# Patient Record
Sex: MALE | Race: BLACK OR AFRICAN AMERICAN | NOT HISPANIC OR LATINO | Employment: OTHER | URBAN - METROPOLITAN AREA
[De-identification: names, ages, dates, MRNs, and addresses within clinical notes are randomized per-mention and may not be internally consistent; named-entity substitution may affect disease eponyms.]

---

## 2020-02-07 ENCOUNTER — HOSPITAL ENCOUNTER (EMERGENCY)
Facility: HOSPITAL | Age: 65
Discharge: HOME | End: 2020-02-07
Attending: EMERGENCY MEDICINE
Payer: COMMERCIAL

## 2020-02-07 VITALS
SYSTOLIC BLOOD PRESSURE: 172 MMHG | RESPIRATION RATE: 28 BRPM | OXYGEN SATURATION: 97 % | HEART RATE: 52 BPM | BODY MASS INDEX: 30.8 KG/M2 | TEMPERATURE: 98 F | WEIGHT: 220 LBS | DIASTOLIC BLOOD PRESSURE: 70 MMHG | HEIGHT: 71 IN

## 2020-02-07 DIAGNOSIS — H81.11 BENIGN POSITIONAL VERTIGO, RIGHT: ICD-10-CM

## 2020-02-07 DIAGNOSIS — R42 DIZZY SPELLS: ICD-10-CM

## 2020-02-07 DIAGNOSIS — J30.2 SEASONAL ALLERGIES: Primary | ICD-10-CM

## 2020-02-07 PROCEDURE — 93005 ELECTROCARDIOGRAM TRACING: CPT | Mod: ER

## 2020-02-07 PROCEDURE — 99284 EMERGENCY DEPT VISIT MOD MDM: CPT | Mod: 25,ER

## 2020-02-07 PROCEDURE — 93010 EKG 12-LEAD: ICD-10-PCS | Mod: ,,, | Performed by: INTERNAL MEDICINE

## 2020-02-07 PROCEDURE — 93010 ELECTROCARDIOGRAM REPORT: CPT | Mod: ,,, | Performed by: INTERNAL MEDICINE

## 2020-02-07 RX ORDER — ALLOPURINOL 100 MG/1
100 TABLET ORAL DAILY
COMMUNITY
End: 2023-01-31

## 2020-02-07 RX ORDER — METOPROLOL SUCCINATE 25 MG/1
25 TABLET, EXTENDED RELEASE ORAL DAILY
COMMUNITY
End: 2023-12-04

## 2020-02-07 RX ORDER — TADALAFIL 10 MG/1
10 TABLET ORAL DAILY
COMMUNITY
End: 2023-07-12

## 2020-02-07 RX ORDER — MECLIZINE HCL 12.5 MG 12.5 MG/1
12.5 TABLET ORAL 3 TIMES DAILY PRN
Qty: 20 TABLET | Refills: 0 | Status: SHIPPED | OUTPATIENT
Start: 2020-02-07 | End: 2023-03-07

## 2020-02-07 RX ORDER — HYDROCHLOROTHIAZIDE 12.5 MG/1
12.5 TABLET ORAL DAILY
COMMUNITY
End: 2023-01-31

## 2020-02-07 RX ORDER — PREDNISONE 10 MG/1
10 TABLET ORAL DAILY
Qty: 5 TABLET | Refills: 0 | Status: SHIPPED | OUTPATIENT
Start: 2020-02-07 | End: 2020-02-12

## 2020-02-08 NOTE — ED PROVIDER NOTES
"Encounter Date: 2/7/2020    SCRIBE #1 NOTE: I, Vilma Jimenez, am scribing for, and in the presence of,  Dr. Tyler Parra. I have scribed the following portions of the note - Other sections scribed: HPI, ROS, PE.       History     Chief Complaint   Patient presents with    Dizziness     PT C/O DIZZY FEELING OFF AND ON SINCE LAST NIGHT WITH ANY SUDDEN MOVEMENT, PT REPORTS WENT TO URGENT CARE AND WAS SENT HERE FOR EVAL, ANTIVERT 25MG GIVEN AT URGENT CARE AT 1843     Maile Harris is a 65 y.o. male who presents to the ED s/p Urgent Care referral (PTA) complaining of acute intermittent dizziness beginning last night. Denies fever, cough, rhinorrhea, ST and nasal congestion. Notes dizziness worsens with "any sudden movements". Mentions Antivert 25mg at 6:43pm via Urgent Care.     The history is provided by the patient and the spouse. No  was used.     Review of patient's allergies indicates:   Allergen Reactions    Lisinopril Other (See Comments)     COUGH     Past Medical History:   Diagnosis Date    Glaucoma     Hypertension      History reviewed. No pertinent surgical history.  No family history on file.  Social History     Tobacco Use    Smoking status: Former Smoker    Smokeless tobacco: Never Used   Substance Use Topics    Alcohol use: Yes     Alcohol/week: 3.0 standard drinks     Types: 3 Cans of beer per week     Comment: 3 BEERS DAILY    Drug use: Never     Review of Systems   Constitutional: Negative.  Negative for fever.   HENT: Negative.  Negative for congestion, rhinorrhea and sore throat.    Eyes: Negative.  Negative for pain.   Respiratory: Negative.  Negative for cough and shortness of breath.    Cardiovascular: Negative.  Negative for chest pain.   Gastrointestinal: Negative.  Negative for abdominal pain and vomiting.   Genitourinary: Negative.  Negative for dysuria.   Musculoskeletal: Negative.  Negative for back pain.   Skin: Negative.  Negative for rash. "   Neurological: Positive for dizziness (Rapid onset. Rapid abatement. Completely postional.). Negative for headaches.   Hematological: Negative.    Psychiatric/Behavioral: Negative.    All other systems reviewed and are negative.      Physical Exam     Initial Vitals [02/07/20 2003]   BP Pulse Resp Temp SpO2   (!) 165/74 (!) 54 20 98.3 °F (36.8 °C) 97 %      MAP       --         Physical Exam    Nursing note and vitals reviewed.  Constitutional: He appears well-developed and well-nourished.   HENT:   Head: Normocephalic and atraumatic.   Right Ear: External ear normal.   Left Ear: External ear normal.   Nose: Mucosal edema present.   Mouth/Throat: Uvula is midline and mucous membranes are normal. No oropharyngeal exudate, posterior oropharyngeal edema or posterior oropharyngeal erythema.   + PND  + Ear fluid level on the RIGHT    Eyes: Conjunctivae and EOM are normal.   Neck: Normal range of motion. Neck supple. Carotid bruit is not present.   Cardiovascular: Normal rate, regular rhythm, normal heart sounds and intact distal pulses. Exam reveals no gallop and no friction rub.    No murmur heard.  Pulmonary/Chest: Effort normal and breath sounds normal. No respiratory distress. He has no wheezes. He has no rhonchi. He has no rales.   Abdominal: Soft. Bowel sounds are normal. He exhibits no distension. There is no tenderness. There is no rebound and no guarding.   Musculoskeletal: Normal range of motion.   Neurological: He is alert and oriented to person, place, and time. He has normal strength. No cranial nerve deficit or sensory deficit. He displays a negative Romberg sign. Coordination and gait normal. GCS eye subscore is 4. GCS verbal subscore is 5. GCS motor subscore is 6.   Skin: Skin is warm and dry.   Psychiatric: He has a normal mood and affect. His behavior is normal.         ED Course   Procedures  Labs Reviewed - No data to display  EKG Readings: (Independently Interpreted)   Initial Reading: No STEMI.  Rhythm: Normal Sinus Rhythm. Heart Rate: 51bpm. Ectopy: No Ectopy. Conduction: Normal. ST Segments: Normal ST Segments. T Waves: Normal. Clinical Impression: Normal Sinus Rhythm       Imaging Results    None          Medical Decision Making:   History:   Old Medical Records: I decided to obtain old medical records.  Independently Interpreted Test(s):   I have ordered and independently interpreted EKG Reading(s) - see prior notes  Clinical Tests:   Medical Tests: Ordered and Reviewed            Scribe Attestation:   Scribe #1: I performed the above scribed service and the documentation accurately describes the services I performed. I attest to the accuracy of the note.               This document was produced by a scribe under my direction and in my presence. I agree with the content of the note and have made any necessary edits.     Tyler Parra MD    02/08/2020 1:22 AM           Clinical Impression:     1. Seasonal allergies    2. Dizzy spells    3. Benign positional vertigo, right                                Tyler Parra MD  02/08/20 0122

## 2022-06-27 ENCOUNTER — TELEPHONE (OUTPATIENT)
Dept: FAMILY MEDICINE | Facility: CLINIC | Age: 67
End: 2022-06-27
Payer: COMMERCIAL

## 2022-09-06 ENCOUNTER — OFFICE VISIT (OUTPATIENT)
Dept: FAMILY MEDICINE | Facility: CLINIC | Age: 67
End: 2022-09-06
Payer: MEDICARE

## 2022-09-06 VITALS
TEMPERATURE: 98 F | HEIGHT: 71 IN | HEART RATE: 45 BPM | OXYGEN SATURATION: 97 % | SYSTOLIC BLOOD PRESSURE: 134 MMHG | DIASTOLIC BLOOD PRESSURE: 62 MMHG | WEIGHT: 216.38 LBS | BODY MASS INDEX: 30.29 KG/M2

## 2022-09-06 DIAGNOSIS — G89.29 CHRONIC BILATERAL LOW BACK PAIN WITHOUT SCIATICA: ICD-10-CM

## 2022-09-06 DIAGNOSIS — Z00.00 ROUTINE ADULT HEALTH MAINTENANCE: Primary | ICD-10-CM

## 2022-09-06 DIAGNOSIS — Z11.59 NEED FOR HEPATITIS C SCREENING TEST: ICD-10-CM

## 2022-09-06 DIAGNOSIS — I10 ESSENTIAL HYPERTENSION: ICD-10-CM

## 2022-09-06 DIAGNOSIS — Z12.5 SCREENING FOR MALIGNANT NEOPLASM OF PROSTATE: ICD-10-CM

## 2022-09-06 DIAGNOSIS — M10.00 IDIOPATHIC GOUT, UNSPECIFIED CHRONICITY, UNSPECIFIED SITE: ICD-10-CM

## 2022-09-06 DIAGNOSIS — E78.5 DYSLIPIDEMIA: ICD-10-CM

## 2022-09-06 DIAGNOSIS — R05.3 CHRONIC COUGH: ICD-10-CM

## 2022-09-06 DIAGNOSIS — M54.50 CHRONIC BILATERAL LOW BACK PAIN WITHOUT SCIATICA: ICD-10-CM

## 2022-09-06 DIAGNOSIS — E66.09 CLASS 1 OBESITY DUE TO EXCESS CALORIES WITH SERIOUS COMORBIDITY AND BODY MASS INDEX (BMI) OF 30.0 TO 30.9 IN ADULT: ICD-10-CM

## 2022-09-06 DIAGNOSIS — Z23 NEED FOR SHINGLES VACCINE: ICD-10-CM

## 2022-09-06 DIAGNOSIS — Z13.6 SCREENING FOR AAA (ABDOMINAL AORTIC ANEURYSM): ICD-10-CM

## 2022-09-06 DIAGNOSIS — R73.03 PREDIABETES: ICD-10-CM

## 2022-09-06 PROCEDURE — 99999 PR PBB SHADOW E&M-EST. PATIENT-LVL IV: CPT | Mod: PBBFAC,,, | Performed by: INTERNAL MEDICINE

## 2022-09-06 PROCEDURE — 99204 OFFICE O/P NEW MOD 45 MIN: CPT | Mod: S$PBB,,, | Performed by: INTERNAL MEDICINE

## 2022-09-06 PROCEDURE — 99214 OFFICE O/P EST MOD 30 MIN: CPT | Mod: PBBFAC,PO | Performed by: INTERNAL MEDICINE

## 2022-09-06 PROCEDURE — 99204 PR OFFICE/OUTPT VISIT, NEW, LEVL IV, 45-59 MIN: ICD-10-PCS | Mod: S$PBB,,, | Performed by: INTERNAL MEDICINE

## 2022-09-06 PROCEDURE — 99999 PR PBB SHADOW E&M-EST. PATIENT-LVL IV: ICD-10-PCS | Mod: PBBFAC,,, | Performed by: INTERNAL MEDICINE

## 2022-09-06 RX ORDER — TADALAFIL 20 MG/1
TABLET ORAL
COMMUNITY
Start: 2022-04-25

## 2022-09-06 RX ORDER — PANTOPRAZOLE SODIUM 20 MG/1
20 TABLET, DELAYED RELEASE ORAL NIGHTLY
Qty: 30 TABLET | Refills: 0 | Status: SHIPPED | OUTPATIENT
Start: 2022-09-06 | End: 2022-09-16 | Stop reason: SDUPTHER

## 2022-09-06 RX ORDER — ROSUVASTATIN CALCIUM 10 MG/1
10 TABLET, COATED ORAL DAILY
COMMUNITY
Start: 2022-06-21 | End: 2022-09-15 | Stop reason: SDUPTHER

## 2022-09-06 RX ORDER — DORZOLAMIDE HCL 20 MG/ML
SOLUTION/ DROPS OPHTHALMIC
COMMUNITY
Start: 2022-08-18

## 2022-09-06 RX ORDER — BRIMONIDINE TARTRATE, TIMOLOL MALEATE 2; 5 MG/ML; MG/ML
1 SOLUTION/ DROPS OPHTHALMIC 2 TIMES DAILY
COMMUNITY
Start: 2022-07-26

## 2022-09-06 RX ORDER — INDAPAMIDE 1.25 MG/1
1.25 TABLET ORAL
COMMUNITY
End: 2023-01-31 | Stop reason: SDUPTHER

## 2022-09-06 RX ORDER — LATANOPROST 50 UG/ML
1 SOLUTION/ DROPS OPHTHALMIC NIGHTLY
COMMUNITY
Start: 2022-09-01
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
## 2022-09-06 NOTE — PATIENT INSTRUCTIONS
The [facility] ([phone]) is a comprehensive treatment approach for individuals with chronic or recurrent neck or back pain. Treatment focuses on active recovery, restoring spinal motion and strength, and ultimately getting people back to the activities they enjoy. While receiving care, patients work with an integrated healthcare team including a Physician or Advanced Practice Provider, Physical Therapists, and Health and Wellness Coaches. Patients also engage in an active physical therapy program focused on pain reduction and return to function, then transition to a Wellness Program for general strength maintenance.    What you should expect    A thorough evaluation with one of our skilled Physical Therapists to identify musculoskeletal impairments and movement dysfunctions  Strength testing of the cervical and/or lumbar extensor muscles using medical machines that isolate the targeted muscle groups. These results will provide a baseline measure of your back or neck strength, help determine an appropriate starting exercise weight, and allow for objective measurement of your progress as you go through treatment.   An active, evidenced-based, research-driven Physical Therapy program focused on neck and/or back pain reduction, targeted strengthening of the spinal and surrounding musculature, and education to promote understanding, prevention, and management of your pain.  Regular meetings with a Health  who will assist you with achieving your health and fitness goals  Enrollment into a wellness program after successful completion of Physical Therapy. Here you will work with a team of Wellness Coaches to help you maintain the strength and functional movement gains you've achieved with Physical Therapy.    Integrated Therapy Team - Your therapy team consists of your Physician, your physical therapists, health coaches, and wellness coaches.  Your team will work together to provide you  with comprehensive care and ensure you are successful in the program. Physical Therapy Evaluation and Measurements- You will be seen by a Physical Therapist for an evaluation.  This evaluation will include a mechanical evaluation to determine the best strategies for managing your symptoms. Your Physical Therapist will also conduct a strength assessment on the medical exercise machines, to determine where your spine strength and motion issues are.   Health Coaching-The health  can assist you with areas you seek guidance in such as nutrition and diet, goal setting, stress management, and deep breathing/relation techniques.  You can meet with the health  face to face or by phone.  Please speak with your therapist if you would like to utilize this valuable service. Clinical Stretches and Exercise - Most neck and back pain gets better or worse depending on the movement or position you are in.  Your physical therapy team will evaluate you and determine the stretches you should perform to control your neck and back pain on your own.   Specialized Therapy Equipment - The equipment protocols used in the program are supported by over 25 years of medical research.  As your spinal strength increases, you will feel better and be able to perform more activities. General Strengthening - It is not just your neck or back that walk around.  Strengthening the muscles in your upper and lower body will   allow you to be more active and enhance your overall health and well-being.   Weekly Visits - Patients are encouraged to attend 2 times a week for an average of 10 weeks, under the direction of a Physical Therapist.  Consistently attending weekly visits allows you to get progressively stronger and get the most out of your program. Wellness Program - After completing your visits in the program with a Physical Therapist, you can participate in our supervised wellness program.  Our wellness program is staffed with [employer]  coaches and is available for you when you complete your therapy.       Scientifically-[employer]    Through progressive resistance strengthening and the principles of adaptive overload, patients suffering from chronic back and neck pain experience improved muscle strength, disc health, increased connective tissue thickness, endurance, mobility, and function over the duration of their treatment. As musculoskeletal function improves, symptoms diminish and their psychological outlook improves.    Over the last 25 years, the [facility], the [facility], and medical institutions around the world have published compelling research showing that specific spinal strengthening using the FDA-registered MedX Lumbar and Cervical Extension machines improves patient outcomes even after multiple failed attempts at other forms of treatment. With more than 75 published articles in peer-reviewed journals, the proof lies in both the research and patient testimonials.    How to Enroll    Give us a call at [phone], our Central Scheduling number, to schedule an initial appointment with our Physicians/APPs or Physical Therapists who will complete a thorough examination to determine if you may benefit from this treatment approach.

## 2022-09-08 ENCOUNTER — LAB VISIT (OUTPATIENT)
Dept: LAB | Facility: HOSPITAL | Age: 67
End: 2022-09-08
Attending: EMERGENCY MEDICINE
Payer: MEDICARE

## 2022-09-08 DIAGNOSIS — I10 ESSENTIAL HYPERTENSION: ICD-10-CM

## 2022-09-08 DIAGNOSIS — E78.5 DYSLIPIDEMIA: ICD-10-CM

## 2022-09-08 DIAGNOSIS — Z12.5 SCREENING FOR MALIGNANT NEOPLASM OF PROSTATE: ICD-10-CM

## 2022-09-08 DIAGNOSIS — Z11.59 NEED FOR HEPATITIS C SCREENING TEST: ICD-10-CM

## 2022-09-08 DIAGNOSIS — M10.00 IDIOPATHIC GOUT, UNSPECIFIED CHRONICITY, UNSPECIFIED SITE: ICD-10-CM

## 2022-09-08 DIAGNOSIS — R73.03 PREDIABETES: ICD-10-CM

## 2022-09-08 LAB
ANION GAP SERPL CALC-SCNC: 8 MMOL/L (ref 8–16)
BASOPHILS # BLD AUTO: 0.05 K/UL (ref 0–0.2)
BASOPHILS NFR BLD: 0.9 % (ref 0–1.9)
BUN SERPL-MCNC: 13 MG/DL (ref 8–23)
CALCIUM SERPL-MCNC: 9.1 MG/DL (ref 8.7–10.5)
CHLORIDE SERPL-SCNC: 101 MMOL/L (ref 95–110)
CHOLEST SERPL-MCNC: 158 MG/DL (ref 120–199)
CHOLEST/HDLC SERPL: 2.5 {RATIO} (ref 2–5)
CO2 SERPL-SCNC: 24 MMOL/L (ref 23–29)
COMPLEXED PSA SERPL-MCNC: 2.2 NG/ML (ref 0–4)
CREAT SERPL-MCNC: 1.2 MG/DL (ref 0.5–1.4)
DIFFERENTIAL METHOD: NORMAL
EOSINOPHIL # BLD AUTO: 0.2 K/UL (ref 0–0.5)
EOSINOPHIL NFR BLD: 3.9 % (ref 0–8)
ERYTHROCYTE [DISTWIDTH] IN BLOOD BY AUTOMATED COUNT: 12.9 % (ref 11.5–14.5)
EST. GFR  (NO RACE VARIABLE): >60 ML/MIN/1.73 M^2
ESTIMATED AVG GLUCOSE: 131 MG/DL (ref 68–131)
GLUCOSE SERPL-MCNC: 108 MG/DL (ref 70–110)
HBA1C MFR BLD: 6.2 % (ref 4–5.6)
HCT VFR BLD AUTO: 41.6 % (ref 40–54)
HCV AB SERPL QL IA: NORMAL
HDLC SERPL-MCNC: 64 MG/DL (ref 40–75)
HDLC SERPL: 40.5 % (ref 20–50)
HGB BLD-MCNC: 14.4 G/DL (ref 14–18)
IMM GRANULOCYTES # BLD AUTO: 0.01 K/UL (ref 0–0.04)
IMM GRANULOCYTES NFR BLD AUTO: 0.2 % (ref 0–0.5)
LDLC SERPL CALC-MCNC: 77.6 MG/DL (ref 63–159)
LYMPHOCYTES # BLD AUTO: 1.9 K/UL (ref 1–4.8)
LYMPHOCYTES NFR BLD: 35 % (ref 18–48)
MCH RBC QN AUTO: 30.8 PG (ref 27–31)
MCHC RBC AUTO-ENTMCNC: 34.6 G/DL (ref 32–36)
MCV RBC AUTO: 89 FL (ref 82–98)
MONOCYTES # BLD AUTO: 0.5 K/UL (ref 0.3–1)
MONOCYTES NFR BLD: 8.8 % (ref 4–15)
NEUTROPHILS # BLD AUTO: 2.8 K/UL (ref 1.8–7.7)
NEUTROPHILS NFR BLD: 51.2 % (ref 38–73)
NONHDLC SERPL-MCNC: 94 MG/DL
NRBC BLD-RTO: 0 /100 WBC
PLATELET # BLD AUTO: 251 K/UL (ref 150–450)
PMV BLD AUTO: 9.8 FL (ref 9.2–12.9)
POTASSIUM SERPL-SCNC: 3.7 MMOL/L (ref 3.5–5.1)
RBC # BLD AUTO: 4.67 M/UL (ref 4.6–6.2)
SODIUM SERPL-SCNC: 133 MMOL/L (ref 136–145)
TRIGL SERPL-MCNC: 82 MG/DL (ref 30–150)
URATE SERPL-MCNC: 6.9 MG/DL (ref 3.4–7)
WBC # BLD AUTO: 5.37 K/UL (ref 3.9–12.7)

## 2022-09-08 PROCEDURE — 84153 ASSAY OF PSA TOTAL: CPT | Performed by: INTERNAL MEDICINE

## 2022-09-08 PROCEDURE — 80061 LIPID PANEL: CPT | Performed by: INTERNAL MEDICINE

## 2022-09-08 PROCEDURE — 80048 BASIC METABOLIC PNL TOTAL CA: CPT | Performed by: INTERNAL MEDICINE

## 2022-09-08 PROCEDURE — 85025 COMPLETE CBC W/AUTO DIFF WBC: CPT | Performed by: INTERNAL MEDICINE

## 2022-09-08 PROCEDURE — 86803 HEPATITIS C AB TEST: CPT | Performed by: INTERNAL MEDICINE

## 2022-09-08 PROCEDURE — 36415 COLL VENOUS BLD VENIPUNCTURE: CPT | Mod: PO | Performed by: INTERNAL MEDICINE

## 2022-09-08 PROCEDURE — 83036 HEMOGLOBIN GLYCOSYLATED A1C: CPT | Performed by: INTERNAL MEDICINE

## 2022-09-08 PROCEDURE — 84550 ASSAY OF BLOOD/URIC ACID: CPT | Performed by: INTERNAL MEDICINE

## 2022-09-12 ENCOUNTER — TELEPHONE (OUTPATIENT)
Dept: FAMILY MEDICINE | Facility: CLINIC | Age: 67
End: 2022-09-12
Payer: MEDICARE

## 2022-09-12 ENCOUNTER — PATIENT MESSAGE (OUTPATIENT)
Dept: ADMINISTRATIVE | Facility: HOSPITAL | Age: 67
End: 2022-09-12
Payer: MEDICARE

## 2022-09-12 DIAGNOSIS — Z12.5 SCREENING FOR PROSTATE CANCER: ICD-10-CM

## 2022-09-12 DIAGNOSIS — E78.5 DYSLIPIDEMIA: Primary | ICD-10-CM

## 2022-09-12 DIAGNOSIS — M10.00 IDIOPATHIC GOUT, UNSPECIFIED CHRONICITY, UNSPECIFIED SITE: ICD-10-CM

## 2022-09-12 DIAGNOSIS — R73.03 PREDIABETES: ICD-10-CM

## 2022-09-12 DIAGNOSIS — I10 ESSENTIAL HYPERTENSION: ICD-10-CM

## 2022-09-12 NOTE — TELEPHONE ENCOUNTER
Below information given to patient, verbalized   Understanding. Patient will like lab orders placed to scheduled appointment before next visit in march please advise.

## 2022-09-12 NOTE — TELEPHONE ENCOUNTER
----- Message from Lamberto Wolfe MD sent at 9/9/2022  8:14 PM CDT -----  Please call the patient regarding the following results:    Sodium level is mildly decreased - this is likely due to medication side effect and is not worrisome. I will repeat in March 2023.    Cholesterol is at our goal. All other labs are otherwise within normal/expected limits.    Let me know if you have any other questions or concerns.      Schedule lab follow-up: Yes - prior to 3/7/23 visit   Schedule appointment: not applicable

## 2022-09-15 ENCOUNTER — HOSPITAL ENCOUNTER (OUTPATIENT)
Dept: RADIOLOGY | Facility: HOSPITAL | Age: 67
Discharge: HOME | End: 2022-09-15
Attending: INTERNAL MEDICINE
Payer: MEDICARE

## 2022-09-15 DIAGNOSIS — Z13.6 SCREENING FOR AAA (ABDOMINAL AORTIC ANEURYSM): ICD-10-CM

## 2022-09-15 DIAGNOSIS — R05.3 CHRONIC COUGH: ICD-10-CM

## 2022-09-15 PROCEDURE — 76775 US ABDOMINAL AORTA: ICD-10-PCS | Mod: 26,,, | Performed by: INTERNAL MEDICINE

## 2022-09-15 PROCEDURE — 76775 US EXAM ABDO BACK WALL LIM: CPT | Mod: TC

## 2022-09-15 PROCEDURE — 76775 US EXAM ABDO BACK WALL LIM: CPT | Mod: 26,,, | Performed by: INTERNAL MEDICINE

## 2022-09-15 NOTE — TELEPHONE ENCOUNTER
----- Message from Ghazala Tavarez sent at 9/15/2022  3:42 PM CDT -----  Type: RX Refill Request    Who Called: self    Have you contacted your pharmacy:yes    Refill or New Rx:refill    RX Name and Strength:rosuvastatin (CRESTOR) 10 MG tablet      Preferred Pharmacy with phone number:[id number] [place] [address] [address] AT [facility]   Phone:  [phone]  Fax:  [phone]      Local or Mail Order:local    Ordering Provider:Aiden    Would the patient rather a call back or a response via My [facility]? call    Best Call Back Number:[phone] ([place])

## 2022-09-15 NOTE — TELEPHONE ENCOUNTER
No new care gaps identified.  [facility] Embedded Care Gaps. Reference number: [id number]. 9/15/2022   3:50:18 PM CDT

## 2022-09-16 RX ORDER — ROSUVASTATIN CALCIUM 10 MG/1
10 TABLET, COATED ORAL DAILY
Qty: 30 TABLET | Refills: 2 | Status: SHIPPED | OUTPATIENT
Start: 2022-09-16 | End: 2023-03-07 | Stop reason: SDUPTHER

## 2022-09-16 RX ORDER — PANTOPRAZOLE SODIUM 20 MG/1
20 TABLET, DELAYED RELEASE ORAL NIGHTLY
Qty: 30 TABLET | Refills: 2 | Status: SHIPPED | OUTPATIENT
Start: 2022-09-16 | End: 2023-03-07 | Stop reason: SDUPTHER

## 2022-10-11 ENCOUNTER — CLINICAL SUPPORT (OUTPATIENT)
Dept: REHABILITATION | Facility: HOSPITAL | Age: 67
End: 2022-10-11
Attending: INTERNAL MEDICINE
Payer: MEDICARE

## 2022-10-11 DIAGNOSIS — R29.3 POOR POSTURE: ICD-10-CM

## 2022-10-11 DIAGNOSIS — M25.69 DECREASED RANGE OF MOTION OF TRUNK AND BACK: ICD-10-CM

## 2022-10-11 DIAGNOSIS — G89.29 CHRONIC BILATERAL LOW BACK PAIN WITHOUT SCIATICA: ICD-10-CM

## 2022-10-11 DIAGNOSIS — R29.898 DECREASED STRENGTH OF TRUNK AND BACK: ICD-10-CM

## 2022-10-11 DIAGNOSIS — M54.50 CHRONIC BILATERAL LOW BACK PAIN WITHOUT SCIATICA: ICD-10-CM

## 2022-10-11 PROCEDURE — 97161 PT EVAL LOW COMPLEX 20 MIN: CPT | Mod: PN

## 2022-10-11 PROCEDURE — 97110 THERAPEUTIC EXERCISES: CPT | Mod: PN
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
## 2022-10-27 LAB — CRC RECOMMENDATION EXT: NORMAL

## 2022-11-04 ENCOUNTER — CLINICAL SUPPORT (OUTPATIENT)
Dept: REHABILITATION | Facility: HOSPITAL | Age: 67
End: 2022-11-04
Payer: MEDICARE

## 2022-11-04 DIAGNOSIS — R29.3 POOR POSTURE: Primary | ICD-10-CM

## 2022-11-04 DIAGNOSIS — R29.898 DECREASED STRENGTH OF TRUNK AND BACK: ICD-10-CM

## 2022-11-04 DIAGNOSIS — M25.69 DECREASED RANGE OF MOTION OF TRUNK AND BACK: ICD-10-CM

## 2022-11-04 PROCEDURE — 97110 THERAPEUTIC EXERCISES: CPT | Mod: PN,CQ

## 2022-11-04 NOTE — PROGRESS NOTES
"[facility] Physical Therapy Treatment      Name: Maile Harris  Clinic Number: [id number]    Therapy Diagnosis:   Encounter Diagnoses   Name Primary?    Poor posture Yes    Decreased range of motion of trunk and back     Decreased strength of trunk and back      Physician: Lamberto Wolfe MD    Visit Date: 11/4/2022    Physician orders: PT Eval and Treat   Medical diagnosis from referral: M54.50,G89.29 (ICD-10-CM) - Chronic bilateral low back pain without sciatica  Evaluation date: 10/11/2022  Authorization period expiration: 9/6/23  Plan of care expiration: 1/11/2023  Reassessment due: 11/11/2022   Visit # / visits authorized: 2/ 20    Time In: 140PM (pt late)  Time Out: 230PM  Total Billable Time: 50 minutes  Insurance type:  Fee for service Insurance Patient    Precautions: Standard HTN     Pattern of pain determined: Pattern 1 PEN    Subjective   Maile VEGA reports improvement of symptoms.  He is feeling pretty good today. He has been performing his HEP though not every day.     Patient reports tolerating previous visit some soreness  Patient reports their pain to be  0 /10 on a 0-10 scale with 0 being no pain and 10 being the worst pain imaginable.  Pain Location: Low back      Occupation: retired, former    Leisure: playing [employer]  Pts goals: "To move easier"     Objective     Baseline IM Testing Results:   Date of testing: 10/11/22  ROM 6-36 deg   Max Peak Torque 72    Min Peak Torque 23    Flex/Ext Ratio 3.1:1   % below normative data 73%            Limitation/restriction for FOTO Lumbar Survey     Therapist reviewed FOTO scores for Maile Harris on 10/11/2022.   FOTO documents entered into [employer] - see Media section.     Limitation Score: 43%             Treatment    Pt was instructed in and performed the following:     Maile VEGA received therapeutic exercises to develop/improve posture, lumbar/thoracic ROM, strength and muscular endurance for 50 minutes including the " "following exercises:      PPT 15 x 3"  DKTC x 10 x 10"  LTR x 20  SOC x 12    [facility] 11/4/2022   Visit Number 2   VAS Pain Rating 0   Treadmill Time (in min.) 10   Speed 1.8   Lumbar Stretches - Slouch Overcorrection 12   Extension in Lying -   Extension in Standing 10   Flexion in Lying -   Lumbar Extension Seat Pad -   Femur Restraint -   Top Dead Center -   Counterweight -   Lumbar Flexion -   Lumbar Extension -   Lumbar Peak Torque -   Min Torque -   Test Percent Below Normative Data -   Lumbar Weight 35   Repetitions 20   Rating of Perceived Exertion 2   Ice - Z Lie (in min.) -         Peripheral muscle strengthening which included 1 set of 15-20 repetitions at a slow, controlled 10-13 second per rep pace focused on strengthening supporting musculature for improved body mechanics and functional mobility.  Pt and therapist focused on proper form during treatment to ensure optimal strengthening of each targeted muscle group.  Machines were utilized including torso rotation, chest press, rowing, biceps, and triceps. Leg extension, leg curl, hip abd, hip add, and leg press added visit 3       Mindymandivitor VEGA received the following manual therapy techniques: null    Home Exercises Provided and Patient Education Provided   Stretching: LTR, PPT, SOC,DKTC  Strengthening:   Cardio program (V5):   Lifting education (V11):  Posture/ Using Lumbar Roll: Encouraged 11/4/22    Education provided:   - HEP compliance  -Program progression  -DOMS    Written Home Exercises Provided: Patient instructed to cont prior HEP.  Exercises were reviewed and Maile VEGA was able to demonstrate them prior to the end of the session.  Maile VEGA demonstrated good  understanding of the education provided.     See EMR under patient instructions for exercises provided 10/11/2022.   Assessment   Shanevitor tolerated first treatment session well. Reviewed HEP with pt requiring Mod verbal cues for form. Stressed the importance of HEP " compliance to maximize therapeutic outcomes. Pt was encouraged to use lumbar roll for posture correction. MedX was performed at 35ft lbs with 20 reps performed at an exertion rating of 2/10. No issues with Matrix UE strengthening.        Patient is making good progress towards established goals.  Pt will continue to benefit from skilled outpatient physical therapy to address the deficits stated in the impairment chart, provide pt/family education and to maximize pt's level of independence in the home and community environment.     Anticipated barriers for therapy: none   Pt's spiritual, cultural and educational needs considered and pt agreeable to plan of care and goals as stated below:     Goals:  Pt is in agreement with the following goals.     Short term goals: 6 weeks or 10 visits   1.  Pt will demonstrate increased lumbar ROM by at least 6 degrees from the initial ROM value with improvements noted in functional ROM and ability to perform ADLs.  (appropriate & ongoing)  2.  Pt will demonstrate increased MedX average isometric strength value by 35% from initial test, resulting in improved ability to perform bending, lifting, and carrying activities safely and confidently.  (appropriate & ongoing)  3.  Pt will report a reduction in worst pain score by 1-2 points for improved tolerance for QoL.  (appropriate & ongoing)  4.  Pt will be able to perform HEP correctly with minimal cueing or supervision from therapist to encourage independent management of symptoms. (appropriate & ongoing)     Long term goals: 10 weeks or 20 visits   1. Pt will demonstrate increased lumbar ROM by at least 12 degrees from initial ROM value, resulting in improved ability to perform functional fwd bending while standing and sitting. (appropriate & ongoing)  2. Pt will demonstrate increased MedX average isometric strength value by 70% from initial test, resulting in improved ability to perform bending, lifting, and carrying activities  safely and confidently.  (appropriate & ongoing)  3. Pt to demonstrate ability to independently control and/or reduce their pain through posture positioning and mechanical movements throughout a typical day. (appropriate & ongoing)  4.  Pt will demonstrate reduced pain and improved functional outcomes as reported on the FOTO by reaching a limitation score of < or = 30%% or less in order to demonstrate subjective improvement in pt's condition. .  (appropriate & ongoing)  5. Pt will demonstrate independence with HEP at discharge. (appropriate & ongoing)     Plan   Continue with established Plan of Care towards established PT goals.       Therapist: Aakash Jamison, PTA  11/4/2022

## 2022-11-09 PROBLEM — H40.9 GLAUCOMA: Status: ACTIVE | Noted: 2022-11-09

## 2022-11-09 PROBLEM — I12.9 CHRONIC KIDNEY DISEASE DUE TO HYPERTENSION: Status: ACTIVE | Noted: 2022-11-09

## 2022-11-09 PROBLEM — N40.1 LOWER URINARY TRACT SYMPTOMS DUE TO BENIGN PROSTATIC HYPERPLASIA: Status: ACTIVE | Noted: 2022-11-09

## 2022-11-09 PROBLEM — N13.8 BPH WITH OBSTRUCTION/LOWER URINARY TRACT SYMPTOMS: Status: ACTIVE | Noted: 2019-05-22

## 2022-11-09 PROBLEM — N40.1 BPH WITH OBSTRUCTION/LOWER URINARY TRACT SYMPTOMS: Status: ACTIVE | Noted: 2019-05-22

## 2022-11-09 PROBLEM — M10.00 IDIOPATHIC GOUT: Status: ACTIVE | Noted: 2022-11-09

## 2022-11-09 PROBLEM — E11.9 TYPE 2 DIABETES MELLITUS WITHOUT COMPLICATION, WITHOUT LONG-TERM CURRENT USE OF INSULIN: Status: ACTIVE | Noted: 2022-11-09

## 2022-11-09 PROBLEM — F51.04 PSYCHOPHYSIOLOGIC INSOMNIA: Status: ACTIVE | Noted: 2022-11-09

## 2022-11-09 PROBLEM — E78.5 HYPERLIPIDEMIA: Status: ACTIVE | Noted: 2022-11-09

## 2022-11-09 PROBLEM — M19.91 PRIMARY OSTEOARTHRITIS: Status: ACTIVE | Noted: 2022-11-09

## 2022-11-09 PROBLEM — N52.9 ED (ERECTILE DYSFUNCTION): Status: ACTIVE | Noted: 2019-05-22

## 2022-11-10 ENCOUNTER — TELEPHONE (OUTPATIENT)
Dept: FAMILY MEDICINE | Facility: CLINIC | Age: 67
End: 2022-11-10
Payer: MEDICARE

## 2022-11-10 ENCOUNTER — CLINICAL SUPPORT (OUTPATIENT)
Dept: REHABILITATION | Facility: HOSPITAL | Age: 67
End: 2022-11-10
Payer: MEDICARE

## 2022-11-10 DIAGNOSIS — R29.3 POOR POSTURE: Primary | ICD-10-CM

## 2022-11-10 DIAGNOSIS — M25.69 DECREASED RANGE OF MOTION OF TRUNK AND BACK: ICD-10-CM

## 2022-11-10 DIAGNOSIS — R29.898 DECREASED STRENGTH OF TRUNK AND BACK: ICD-10-CM

## 2022-11-10 PROCEDURE — 97110 THERAPEUTIC EXERCISES: CPT | Mod: PN

## 2022-11-10 NOTE — PROGRESS NOTES
"[facility]      Name: Maile Harris  Clinic Number: [id number]    Therapy Diagnosis:   Encounter Diagnoses   Name Primary?    Poor posture Yes    Decreased range of motion of trunk and back     Decreased strength of trunk and back      Physician: Lamberto Wolfe MD    Visit Date: 11/10/2022    Physician orders: PT Eval and Treat   Medical diagnosis from referral: M54.50,G89.29 (ICD-10-CM) - Chronic bilateral low back pain without sciatica  Evaluation date: 10/11/2022  Authorization period expiration: 9/6/23  Plan of care expiration: 1/11/2023  Reassessment due: 11/11/2022   Visit # / visits authorized: 3/ 20    Time In: 1500  Time Out: 1600  Total Billable Time: 55 minutes  Insurance type:  Fee for service Insurance Patient    Precautions: Standard HTN     Pattern of pain determined: Pattern 1 PEN    Subjective   Maile VEGA reports that he is feeling pretty good today. No pain noted and he has been compliant with exercises at home.     Patient reports tolerating previous visit some soreness  Patient reports their pain to be  0 /10 on a 0-10 scale with 0 being no pain and 10 being the worst pain imaginable.  Pain Location: Low back      Occupation: retired, former    Leisure: playing [email or web address]  Pts goals: "To move easier"     Objective     Baseline IM Testing Results:   Date of testing: 10/11/22  ROM 6-36 deg   Max Peak Torque 72    Min Peak Torque 23    Flex/Ext Ratio 3.1:1   % below normative data 73%            Limitation/restriction for FOTO Lumbar Survey     Therapist reviewed FOTO scores for Maile Harris on 10/11/2022.   FOTO documents entered into [employer] - see Media section.     Limitation Score: 43%             Treatment    Pt was instructed in and performed the following:     Maile VEGA received therapeutic exercises to develop/improve posture, lumbar/thoracic ROM, strength and muscular endurance for 50 minutes including the following exercises:    " "  PPT 15 x 3"  DKTC x 10 x 10"  LTR x 20  SOC x 12  +Bridges x15  +Hamstring stretch 3x20" ea    [facility] 11/10/2022   Visit Number 3   VAS Pain Rating 0   Treadmill Time (in min.) -   Speed -   Time 8   Lumbar Stretches - Slouch Overcorrection 12   Extension in Lying -   Extension in Standing 12   Flexion in Lying 10   Lumbar Extension Seat Pad -   Femur Restraint -   Top Dead Center -   Counterweight -   Lumbar Flexion -   Lumbar Extension -   Lumbar Peak Torque -   Min Torque -   Test Percent Below Normative Data -   Lumbar Weight 45   Repetitions 15   Rating of Perceived Exertion 3   Ice - Z Lie (in min.) -        Peripheral muscle strengthening which included 1 set of 15-20 repetitions at a slow, controlled 10-13 second per rep pace focused on strengthening supporting musculature for improved body mechanics and functional mobility.  Pt and therapist focused on proper form during treatment to ensure optimal strengthening of each targeted muscle group.  Machines were utilized including torso rotation, chest press, rowing, biceps, and triceps. Leg extension, leg curl, hip abd, hip add, and leg press added visit 3       Maile VEGA received the following manual therapy techniques: null    Home Exercises Provided and Patient Education Provided   Stretching: LTR, PPT, SOC,DKTC  Strengthening:   Cardio program (V5):   Lifting education (V11):  Posture/ Using Lumbar Roll: Encouraged 11/4/22    Education provided:   - HEP compliance  -Program progression  -DOMS    Written Home Exercises Provided: Patient instructed to cont prior HEP.  Exercises were reviewed and Maile VEGA was able to demonstrate them prior to the end of the session.  Maile VEGA demonstrated good  understanding of the education provided.     See EMR under patient instructions for exercises provided 10/11/2022.   Assessment   Maile VEGA tolerated treatment well today. Reports no pain upon arrival to clinic. Continued prior therex and progressed " with hamstring stretch and bridges to further challenge patient. Patient able to perform 15 reps at increased resistance on MedX machine with appropriate exertion. Added lower extremity peripheral machines today with no issues. Will continue to progress as able.         Patient is making good progress towards established goals.  Pt will continue to benefit from skilled outpatient physical therapy to address the deficits stated in the impairment chart, provide pt/family education and to maximize pt's level of independence in the home and community environment.     Anticipated barriers for therapy: none   Pt's spiritual, cultural and educational needs considered and pt agreeable to plan of care and goals as stated below:     Goals:  Pt is in agreement with the following goals.     Short term goals: 6 weeks or 10 visits   1.  Pt will demonstrate increased lumbar ROM by at least 6 degrees from the initial ROM value with improvements noted in functional ROM and ability to perform ADLs.  (appropriate & ongoing)  2.  Pt will demonstrate increased MedX average isometric strength value by 35% from initial test, resulting in improved ability to perform bending, lifting, and carrying activities safely and confidently.  (appropriate & ongoing)  3.  Pt will report a reduction in worst pain score by 1-2 points for improved tolerance for QoL.  (appropriate & ongoing)  4.  Pt will be able to perform HEP correctly with minimal cueing or supervision from therapist to encourage independent management of symptoms. (appropriate & ongoing)     Long term goals: 10 weeks or 20 visits   1. Pt will demonstrate increased lumbar ROM by at least 12 degrees from initial ROM value, resulting in improved ability to perform functional fwd bending while standing and sitting. (appropriate & ongoing)  2. Pt will demonstrate increased MedX average isometric strength value by 70% from initial test, resulting in improved ability to perform bending,  lifting, and carrying activities safely and confidently.  (appropriate & ongoing)  3. Pt to demonstrate ability to independently control and/or reduce their pain through posture positioning and mechanical movements throughout a typical day. (appropriate & ongoing)  4.  Pt will demonstrate reduced pain and improved functional outcomes as reported on the FOTO by reaching a limitation score of < or = 30%% or less in order to demonstrate subjective improvement in pt's condition. .  (appropriate & ongoing)  5. Pt will demonstrate independence with HEP at discharge. (appropriate & ongoing)     Plan   Continue with established Plan of Care towards established PT goals.       Therapist: José Miguel Larios, PT  11/10/2022

## 2022-11-10 NOTE — TELEPHONE ENCOUNTER
----- Message from Micehlle Rizzo sent at 11/10/2022 10:58 AM CST -----  .Type: Patient Call Back    Who called: Wife Darren    What is the request in detail: PSA Results faxed to  Urologist    Can the clinic reply by [facility]? No    Would the patient rather a call back or a response via [facility]? Call    Best call back number: .[phone] (cell)       Additional Information:      Dr. Kumar Zelaya - [phone] office

## 2022-12-02 ENCOUNTER — CLINICAL SUPPORT (OUTPATIENT)
Dept: REHABILITATION | Facility: HOSPITAL | Age: 67
End: 2022-12-02
Payer: MEDICARE

## 2022-12-02 DIAGNOSIS — M25.69 DECREASED RANGE OF MOTION OF TRUNK AND BACK: ICD-10-CM

## 2022-12-02 DIAGNOSIS — R29.3 POOR POSTURE: Primary | ICD-10-CM

## 2022-12-02 DIAGNOSIS — R29.898 DECREASED STRENGTH OF TRUNK AND BACK: ICD-10-CM

## 2022-12-02 PROCEDURE — 97110 THERAPEUTIC EXERCISES: CPT | Mod: PN,CQ

## 2022-12-02 NOTE — PROGRESS NOTES
"[facility]      Name: Maile Harris  Clinic Number: [id number]    Therapy Diagnosis:   Encounter Diagnoses   Name Primary?    Poor posture Yes    Decreased range of motion of trunk and back     Decreased strength of trunk and back          Physician: Lamberto Wolfe MD    Visit Date: 12/2/2022    Physician orders: PT Eval and Treat   Medical diagnosis from referral: M54.50,G89.29 (ICD-10-CM) - Chronic bilateral low back pain without sciatica  Evaluation date: 10/11/2022  Authorization period expiration: 9/6/23  Plan of care expiration: 1/11/2023  Reassessment due: 11/11/2022   Visit # / visits authorized: 4/ 20    Time In: 1000AM  Time Out: 1100AM  Total Billable Time: 60 minutes  Insurance type:  Fee for service Insurance Patient    Precautions: Standard HTN     Pattern of pain determined: Pattern 1 PEN    Subjective   Maile VEGA reports he woke up with back pain this morning but he has no pain now. He performed lower trunk rotation and knees to chest this morning.     Patient reports tolerating previous visit fine  Patient reports their pain to be  0 /10 on a 0-10 scale with 0 being no pain and 10 being the worst pain imaginable.  Pain Location: Low back      Occupation: retired, former    Leisure: playing [employer]  Pts goals: "To move easier"     Objective     Baseline IM Testing Results:   Date of testing: 10/11/22  ROM 6-36 deg   Max Peak Torque 72    Min Peak Torque 23    Flex/Ext Ratio 3.1:1   % below normative data 73%            Limitation/restriction for FOTO Lumbar Survey     Therapist reviewed FOTO scores for Maile Harris on 10/11/2022.   FOTO documents entered into [employer] - see Media section.     Limitation Score: 43%             Treatment    Pt was instructed in and performed the following:     Maile VEGA received therapeutic exercises to develop/improve posture, lumbar/thoracic ROM, strength and muscular endurance for 60 minutes including the " "following exercises:      PPT 15 x 3"  DKTC x 10 x 10"  LTR x 20  SOC x 12  Bridges 15 x 3"  Hamstring stretch 2x30" ea    [facility] 12/2/2022   Visit Number 4   VAS Pain Rating 0   Treadmill Time (in min.) 8   Speed -   Time -   Lumbar Stretches - Slouch Overcorrection 12   Extension in Lying -   Extension in Standing -   Flexion in Lying 10   Lumbar Extension Seat Pad -   Femur Restraint -   Top Dead Center -   Counterweight -   Lumbar Flexion -   Lumbar Extension -   Lumbar Peak Torque -   Min Torque -   Test Percent Below Normative Data -   Lumbar Weight 45   Repetitions 20   Rating of Perceived Exertion 4   Ice - Z Lie (in min.) -         Peripheral muscle strengthening which included 1 set of 15-20 repetitions at a slow, controlled 10-13 second per rep pace focused on strengthening supporting musculature for improved body mechanics and functional mobility.  Pt and therapist focused on proper form during treatment to ensure optimal strengthening of each targeted muscle group.  Machines were utilized including torso rotation, chest press, rowing, biceps, and triceps. Leg extension, leg curl, hip abd, hip add, and leg press added visit 3       Maile VEGA received the following manual therapy techniques: null    Home Exercises Provided and Patient Education Provided   Stretching: LTR, PPT, SOC,DKTC, HSS  Strengthening: Bridges  Cardio program (V5):   Lifting education (V11):  Posture/ Using Lumbar Roll: Encouraged 11/4/22    Education provided:   - HEP compliance  -Program progression  -DOMS    Written Home Exercises Provided: Patient instructed to cont prior HEP.  Exercises were reviewed and Maile VEGA was able to demonstrate them prior to the end of the session.  Maile VEGA demonstrated good  understanding of the education provided.     See EMR under patient instructions for exercises provided 10/11/2022.   Assessment   Maile VEGA tolerated treatment well today. Pt reports to session without c/o pain. " Continued HEP reinforcement. Pt has limited pelvic mobility that improved slightly with heavy verbal and tactile cues. Pt was encouraged to purchase a lumbar roll and demonstrated lumbar roll use versus slouch correct exercise.  Pt has poor motor control when performing lumbar extension on MedX. MedX was performed at 45ft lbs with 20 reps performed at an exertion rating of 4/10. No issues with Matrix strengthening.          Patient is making good progress towards established goals.  Pt will continue to benefit from skilled outpatient physical therapy to address the deficits stated in the impairment chart, provide pt/family education and to maximize pt's level of independence in the home and community environment.     Anticipated barriers for therapy: none   Pt's spiritual, cultural and educational needs considered and pt agreeable to plan of care and goals as stated below:     Goals:  Pt is in agreement with the following goals.     Short term goals: 6 weeks or 10 visits   1.  Pt will demonstrate increased lumbar ROM by at least 6 degrees from the initial ROM value with improvements noted in functional ROM and ability to perform ADLs.  (appropriate & ongoing)  2.  Pt will demonstrate increased MedX average isometric strength value by 35% from initial test, resulting in improved ability to perform bending, lifting, and carrying activities safely and confidently.  (appropriate & ongoing)  3.  Pt will report a reduction in worst pain score by 1-2 points for improved tolerance for QoL.  (appropriate & ongoing)  4.  Pt will be able to perform HEP correctly with minimal cueing or supervision from therapist to encourage independent management of symptoms. (appropriate & ongoing)     Long term goals: 10 weeks or 20 visits   1. Pt will demonstrate increased lumbar ROM by at least 12 degrees from initial ROM value, resulting in improved ability to perform functional fwd bending while standing and sitting. (appropriate &  ongoing)  2. Pt will demonstrate increased MedX average isometric strength value by 70% from initial test, resulting in improved ability to perform bending, lifting, and carrying activities safely and confidently.  (appropriate & ongoing)  3. Pt to demonstrate ability to independently control and/or reduce their pain through posture positioning and mechanical movements throughout a typical day. (appropriate & ongoing)  4.  Pt will demonstrate reduced pain and improved functional outcomes as reported on the FOTO by reaching a limitation score of < or = 30%% or less in order to demonstrate subjective improvement in pt's condition. .  (appropriate & ongoing)  5. Pt will demonstrate independence with HEP at discharge. (appropriate & ongoing)     Plan   Continue with established Plan of Care towards established PT goals.       Therapist: Aakash Jamison, PTA  12/2/2022

## 2022-12-07 NOTE — PROGRESS NOTES
"[facility]      Name: Maile Harris  Clinic Number: [id number]    Therapy Diagnosis:   Encounter Diagnoses   Name Primary?    Poor posture Yes    Decreased range of motion of trunk and back     Decreased strength of trunk and back            Physician: Lamberto Wolfe MD    Visit Date: 12/9/2022    Physician orders: PT Eval and Treat   Medical diagnosis from referral: M54.50,G89.29 (ICD-10-CM) - Chronic bilateral low back pain without sciatica  Evaluation date: 10/11/2022  Authorization period expiration: 9/6/23  Plan of care expiration: 1/11/2023  Reassessment due: 11/11/2022   Visit # / visits authorized: 5/ 20    Time In: 1000AM   Time Out: 1059AM  Total Billable Time: 59 minutes  Insurance type:  Fee for service Insurance Patient    Precautions: Standard HTN     Pattern of pain determined: Pattern 1 PEN    Subjective   Maile munoz is doing good. NO pain today and no recent flare ups. Pt reports he has been walking one hour 2-3 times a week.     Patient reports tolerating previous visit fine  Patient reports their pain to be  0 /10 on a 0-10 scale with 0 being no pain and 10 being the worst pain imaginable.  Pain Location: Low back      Occupation: retired, former    Leisure: playing [employer]  Pts goals: "To move easier"     Objective     Baseline IM Testing Results:   Date of testing: 10/11/22  ROM 6-36 deg   Max Peak Torque 72    Min Peak Torque 23    Flex/Ext Ratio 3.1:1   % below normative data 73%            Limitation/restriction for FOTO Lumbar Survey     Therapist reviewed FOTO scores for Maile Harris on 10/11/2022.   FOTO documents entered into [employer] - see Media section.     Limitation Score: 43%             Treatment    Pt was instructed in and performed the following:     Maile VEGA received therapeutic exercises to develop/improve posture, lumbar/thoracic ROM, strength and muscular endurance for 59 minutes including the following exercises: " "     PPT 15 x 3"  DKTC x 10 x 10"  LTR x 20  SOC x 12 - np  Bridges 15 x 3"  Hamstring stretch 2x30" ea  +[place] carry 20KB x 1 lap ea   +[employer] pallof press 7# x 15 ea       [facility] 12/9/2022   Visit Number 5   VAS Pain Rating 0   Treadmill Time (in min.) 10   Speed -   Time -   Lumbar Stretches - Slouch Overcorrection 12   Extension in Lying -   Extension in Standing -   Flexion in Lying 10   Lumbar Extension Seat Pad -   Femur Restraint -   Top Dead Center -   Counterweight -   Lumbar Flexion -   Lumbar Extension -   Lumbar Peak Torque -   Min Torque -   Test Percent Below Normative Data -   Lumbar Weight 47   Repetitions 18   Rating of Perceived Exertion 4   Ice - Z Lie (in min.) -         Peripheral muscle strengthening which included 1 set of 15-20 repetitions at a slow, controlled 10-13 second per rep pace focused on strengthening supporting musculature for improved body mechanics and functional mobility.  Pt and therapist focused on proper form during treatment to ensure optimal strengthening of each targeted muscle group.  Machines were utilized including torso rotation, chest press, rowing, biceps, and triceps. Leg extension, leg curl, hip abd, hip add, and leg press added visit 3       Maile VEGA received the following manual therapy techniques: null    Home Exercises Provided and Patient Education Provided   Stretching: LTR, PPT, SOC,DKTC, HSS  Strengthening: Bridges  Cardio program (V5): 12/9/22  Lifting education (V11):  Posture/ Using Lumbar Roll: Encouraged 11/4/22    Education provided:   - HEP compliance  -Program progression  -DOMS    Written Home Exercises Provided: Patient instructed to cont prior HEP.  Exercises were reviewed and Maile VEGA was able to demonstrate them prior to the end of the session.  Maile VEGA demonstrated good  understanding of the education provided.     See EMR under patient instructions for exercises provided 10/11/2022.   Assessment   Maile VEGA " tolerated treatment well today. Pt reports to session without c/o pain. Cardio handout given with pt reporting he walks for 1 hour 2-3 x  week. Progressed core strengthening without issue. Pt continues with limited pelvic mobility. MedX was performed at 47ft lbs with 18 reps performed at an exertion rating of 4/10. No issues with Matrix strengthening.         Patient is making good progress towards established goals.  Pt will continue to benefit from skilled outpatient physical therapy to address the deficits stated in the impairment chart, provide pt/family education and to maximize pt's level of independence in the home and community environment.     Anticipated barriers for therapy: none   Pt's spiritual, cultural and educational needs considered and pt agreeable to plan of care and goals as stated below:     Goals:  Pt is in agreement with the following goals.     Short term goals: 6 weeks or 10 visits   1.  Pt will demonstrate increased lumbar ROM by at least 6 degrees from the initial ROM value with improvements noted in functional ROM and ability to perform ADLs.  (appropriate & ongoing)  2.  Pt will demonstrate increased MedX average isometric strength value by 35% from initial test, resulting in improved ability to perform bending, lifting, and carrying activities safely and confidently.  (appropriate & ongoing)  3.  Pt will report a reduction in worst pain score by 1-2 points for improved tolerance for QoL.  (appropriate & ongoing)  4.  Pt will be able to perform HEP correctly with minimal cueing or supervision from therapist to encourage independent management of symptoms. (appropriate & ongoing)     Long term goals: 10 weeks or 20 visits   1. Pt will demonstrate increased lumbar ROM by at least 12 degrees from initial ROM value, resulting in improved ability to perform functional fwd bending while standing and sitting. (appropriate & ongoing)  2. Pt will demonstrate increased MedX average isometric  strength value by 70% from initial test, resulting in improved ability to perform bending, lifting, and carrying activities safely and confidently.  (appropriate & ongoing)  3. Pt to demonstrate ability to independently control and/or reduce their pain through posture positioning and mechanical movements throughout a typical day. (appropriate & ongoing)  4.  Pt will demonstrate reduced pain and improved functional outcomes as reported on the FOTO by reaching a limitation score of < or = 30%% or less in order to demonstrate subjective improvement in pt's condition. .  (appropriate & ongoing)  5. Pt will demonstrate independence with HEP at discharge. (appropriate & ongoing)     Plan   Continue with established Plan of Care towards established PT goals.       Therapist: Aakash Jamison, PTA  12/09/2022

## 2022-12-09 ENCOUNTER — CLINICAL SUPPORT (OUTPATIENT)
Dept: REHABILITATION | Facility: HOSPITAL | Age: 67
End: 2022-12-09
Payer: MEDICARE

## 2022-12-09 DIAGNOSIS — R29.898 DECREASED STRENGTH OF TRUNK AND BACK: ICD-10-CM

## 2022-12-09 DIAGNOSIS — R29.3 POOR POSTURE: Primary | ICD-10-CM

## 2022-12-09 DIAGNOSIS — M25.69 DECREASED RANGE OF MOTION OF TRUNK AND BACK: ICD-10-CM

## 2022-12-09 PROCEDURE — 97110 THERAPEUTIC EXERCISES: CPT | Mod: PN,CQ

## 2023-01-27 ENCOUNTER — TELEPHONE (OUTPATIENT)
Dept: FAMILY MEDICINE | Facility: CLINIC | Age: 68
End: 2023-01-27
Payer: COMMERCIAL

## 2023-01-27 DIAGNOSIS — I10 ESSENTIAL HYPERTENSION: Primary | ICD-10-CM

## 2023-01-27 RX ORDER — INDAPAMIDE 1.25 MG/1
1.25 TABLET ORAL
Status: CANCELLED | OUTPATIENT
Start: 2023-01-27

## 2023-01-27 RX ORDER — ALLOPURINOL 100 MG/1
100 TABLET ORAL DAILY
Status: CANCELLED | OUTPATIENT
Start: 2023-01-27

## 2023-01-27 NOTE — TELEPHONE ENCOUNTER
----- Message from Magalie Plaza sent at 1/27/2023  1:47 PM CST -----  Type: RX Refill Request        Who Called: wife Paulino Montes         Have you contacted your pharmacy: no         Refill or New Rx: refill           RX Name and Strength:   allopurinoL (ZYLOPRIM) 100 MG tablet and indapamide (LOZOL) 1.25 MG Tab                Preferred Pharmacy with phone number:   [facility] #[id number] [place] [address] [place] AT [facility]   Phone:  [phone]  Fax:  [phone]              Local or Mail Order: local         Ordering Provider:          Would the patient rather a call back or a response via [facility]? Yes         Best Call Back Number: [phone] (home)           Additional Information: Pt states both of these medication are a 90 day supply           Thank you    has context menu

## 2023-01-27 NOTE — TELEPHONE ENCOUNTER
Care Due:                  Date            Visit Type   Department     Provider  --------------------------------------------------------------------------------                                NP -         [facility]                              PRIMARY      MED/ INTERNAL  Last Visit: 09-      CARE ([facility])   MED/ GAURAVS      Lamberto ZHOU -         [facility]      MED/ INTERNAL  Next Visit: 03-      CARE ([facility])   MED/ PEDS      Lamberto Saldaña  Test          Frequency    Reason                     Performed    Due Date  --------------------------------------------------------------------------------    CMP.........  12 months..  rosuvastatin.............  Not Found    Overdue    [employer] Embedded Care Gaps. Reference number: [id number]. 1/27/2023   2:09:06 PM CST

## 2023-01-27 NOTE — TELEPHONE ENCOUNTER
Can you call pt to reconcile discrepancy in medication refill request -- requesting INDAPAMIDE however patient is also currently on HCTZ - both of these medications are in the same class (diuretic). Please confirm which medication patient is taking currently and I will send appropriate refill

## 2023-01-28 NOTE — TELEPHONE ENCOUNTER
No new care gaps identified.  [facility] Embedded Care Gaps. Reference number: [id number]. 1/28/2023   1:16:29 PM CST

## 2023-01-29 NOTE — TELEPHONE ENCOUNTER
Refill Routing Note   Medication(s) are not appropriate for processing by [facility] for the following reason(s):       Requires lab    [facility] action(s):  Defer                          Appointments  past 12m or future 3m with PCP    Date Provider   Last Visit   9/6/2022 Lamberto Wolfe MD   Next Visit   3/7/2023 Lamberto Wolfe MD   ED visits in past 90 days: 0        Note composed:10:03 PM 01/28/2023

## 2023-01-30 NOTE — TELEPHONE ENCOUNTER
Spoke with patient he is only taking indapamide  and metoprolol and indapamide for his  blood pressure.  arvin Farris be advised thank you.

## 2023-01-31 PROBLEM — I10 ESSENTIAL HYPERTENSION: Status: ACTIVE | Noted: 2023-01-31

## 2023-01-31 RX ORDER — INDAPAMIDE 1.25 MG/1
TABLET ORAL
Qty: 90 TABLET | OUTPATIENT
Start: 2023-01-31

## 2023-01-31 RX ORDER — ALLOPURINOL 100 MG/1
TABLET ORAL
Qty: 90 TABLET | Refills: 1 | Status: SHIPPED | OUTPATIENT
Start: 2023-01-31 | End: 2023-07-30

## 2023-01-31 RX ORDER — INDAPAMIDE 1.25 MG/1
1.25 TABLET ORAL DAILY
Qty: 90 TABLET | Refills: 3 | Status: SHIPPED | OUTPATIENT
Start: 2023-01-31 | End: 2024-01-09

## 2023-02-28 ENCOUNTER — LAB VISIT (OUTPATIENT)
Dept: LAB | Facility: HOSPITAL | Age: 68
End: 2023-02-28
Attending: INTERNAL MEDICINE
Payer: MEDICARE

## 2023-02-28 DIAGNOSIS — R73.03 PREDIABETES: ICD-10-CM

## 2023-02-28 DIAGNOSIS — Z12.5 SCREENING FOR PROSTATE CANCER: ICD-10-CM

## 2023-02-28 DIAGNOSIS — I10 ESSENTIAL HYPERTENSION: ICD-10-CM

## 2023-02-28 DIAGNOSIS — M10.00 IDIOPATHIC GOUT, UNSPECIFIED CHRONICITY, UNSPECIFIED SITE: ICD-10-CM

## 2023-02-28 LAB
ANION GAP SERPL CALC-SCNC: 7 MMOL/L (ref 8–16)
BASOPHILS # BLD AUTO: 0.04 K/UL (ref 0–0.2)
BASOPHILS NFR BLD: 0.6 % (ref 0–1.9)
BUN SERPL-MCNC: 13 MG/DL (ref 8–23)
CALCIUM SERPL-MCNC: 9.5 MG/DL (ref 8.7–10.5)
CHLORIDE SERPL-SCNC: 100 MMOL/L (ref 95–110)
CHOLEST SERPL-MCNC: 169 MG/DL (ref 120–199)
CHOLEST/HDLC SERPL: 2.7 {RATIO} (ref 2–5)
CO2 SERPL-SCNC: 28 MMOL/L (ref 23–29)
COMPLEXED PSA SERPL-MCNC: 2.6 NG/ML (ref 0–4)
CREAT SERPL-MCNC: 1.2 MG/DL (ref 0.5–1.4)
DIFFERENTIAL METHOD: ABNORMAL
EOSINOPHIL # BLD AUTO: 0.1 K/UL (ref 0–0.5)
EOSINOPHIL NFR BLD: 1.7 % (ref 0–8)
ERYTHROCYTE [DISTWIDTH] IN BLOOD BY AUTOMATED COUNT: 13 % (ref 11.5–14.5)
EST. GFR  (NO RACE VARIABLE): >60 ML/MIN/1.73 M^2
ESTIMATED AVG GLUCOSE: 128 MG/DL (ref 68–131)
GLUCOSE SERPL-MCNC: 105 MG/DL (ref 70–110)
HBA1C MFR BLD: 6.1 % (ref 4–5.6)
HCT VFR BLD AUTO: 43 % (ref 40–54)
HDLC SERPL-MCNC: 62 MG/DL (ref 40–75)
HDLC SERPL: 36.7 % (ref 20–50)
HGB BLD-MCNC: 13.9 G/DL (ref 14–18)
IMM GRANULOCYTES # BLD AUTO: 0.02 K/UL (ref 0–0.04)
IMM GRANULOCYTES NFR BLD AUTO: 0.3 % (ref 0–0.5)
LDLC SERPL CALC-MCNC: 90.6 MG/DL (ref 63–159)
LYMPHOCYTES # BLD AUTO: 1.9 K/UL (ref 1–4.8)
LYMPHOCYTES NFR BLD: 29.9 % (ref 18–48)
MCH RBC QN AUTO: 30 PG (ref 27–31)
MCHC RBC AUTO-ENTMCNC: 32.3 G/DL (ref 32–36)
MCV RBC AUTO: 93 FL (ref 82–98)
MONOCYTES # BLD AUTO: 0.6 K/UL (ref 0.3–1)
MONOCYTES NFR BLD: 9.7 % (ref 4–15)
NEUTROPHILS # BLD AUTO: 3.7 K/UL (ref 1.8–7.7)
NEUTROPHILS NFR BLD: 57.8 % (ref 38–73)
NONHDLC SERPL-MCNC: 107 MG/DL
NRBC BLD-RTO: 0 /100 WBC
PLATELET # BLD AUTO: 258 K/UL (ref 150–450)
PMV BLD AUTO: 9.5 FL (ref 9.2–12.9)
POTASSIUM SERPL-SCNC: 3.9 MMOL/L (ref 3.5–5.1)
RBC # BLD AUTO: 4.64 M/UL (ref 4.6–6.2)
SODIUM SERPL-SCNC: 135 MMOL/L (ref 136–145)
TRIGL SERPL-MCNC: 82 MG/DL (ref 30–150)
URATE SERPL-MCNC: 6.5 MG/DL (ref 3.4–7)
WBC # BLD AUTO: 6.36 K/UL (ref 3.9–12.7)

## 2023-02-28 PROCEDURE — 36415 COLL VENOUS BLD VENIPUNCTURE: CPT | Mod: PO | Performed by: INTERNAL MEDICINE

## 2023-02-28 PROCEDURE — 83036 HEMOGLOBIN GLYCOSYLATED A1C: CPT | Performed by: INTERNAL MEDICINE

## 2023-02-28 PROCEDURE — 84550 ASSAY OF BLOOD/URIC ACID: CPT | Performed by: INTERNAL MEDICINE

## 2023-02-28 PROCEDURE — 80048 BASIC METABOLIC PNL TOTAL CA: CPT | Performed by: INTERNAL MEDICINE

## 2023-02-28 PROCEDURE — 84153 ASSAY OF PSA TOTAL: CPT | Performed by: INTERNAL MEDICINE

## 2023-02-28 PROCEDURE — 80061 LIPID PANEL: CPT | Performed by: INTERNAL MEDICINE

## 2023-02-28 PROCEDURE — 85025 COMPLETE CBC W/AUTO DIFF WBC: CPT | Performed by: INTERNAL MEDICINE

## 2023-03-07 ENCOUNTER — OFFICE VISIT (OUTPATIENT)
Dept: FAMILY MEDICINE | Facility: CLINIC | Age: 68
End: 2023-03-07
Payer: MEDICARE

## 2023-03-07 VITALS
DIASTOLIC BLOOD PRESSURE: 62 MMHG | OXYGEN SATURATION: 97 % | HEART RATE: 48 BPM | WEIGHT: 218.06 LBS | TEMPERATURE: 99 F | SYSTOLIC BLOOD PRESSURE: 134 MMHG | HEIGHT: 71 IN | BODY MASS INDEX: 30.53 KG/M2

## 2023-03-07 DIAGNOSIS — E78.5 DYSLIPIDEMIA: ICD-10-CM

## 2023-03-07 DIAGNOSIS — E66.9 OBESITY (BMI 30-39.9): ICD-10-CM

## 2023-03-07 DIAGNOSIS — N13.8 BPH WITH OBSTRUCTION/LOWER URINARY TRACT SYMPTOMS: ICD-10-CM

## 2023-03-07 DIAGNOSIS — R00.1 SINUS BRADYCARDIA: ICD-10-CM

## 2023-03-07 DIAGNOSIS — J30.89 ALLERGIC RHINITIS DUE TO OTHER ALLERGIC TRIGGER, UNSPECIFIED SEASONALITY: ICD-10-CM

## 2023-03-07 DIAGNOSIS — R05.3 CHRONIC COUGH: ICD-10-CM

## 2023-03-07 DIAGNOSIS — N40.1 BPH WITH OBSTRUCTION/LOWER URINARY TRACT SYMPTOMS: ICD-10-CM

## 2023-03-07 DIAGNOSIS — R01.0 BENIGN CARDIAC MURMUR: Primary | ICD-10-CM

## 2023-03-07 DIAGNOSIS — Z90.01 H/O ENUCLEATION OF RIGHT EYEBALL: ICD-10-CM

## 2023-03-07 DIAGNOSIS — M10.00 IDIOPATHIC GOUT, UNSPECIFIED CHRONICITY, UNSPECIFIED SITE: ICD-10-CM

## 2023-03-07 PROBLEM — E11.9 TYPE 2 DIABETES MELLITUS WITHOUT COMPLICATION, WITHOUT LONG-TERM CURRENT USE OF INSULIN: Status: RESOLVED | Noted: 2022-11-09 | Resolved: 2023-03-07

## 2023-03-07 PROCEDURE — 99999 PR PBB SHADOW E&M-EST. PATIENT-LVL III: ICD-10-PCS | Mod: PBBFAC,,, | Performed by: INTERNAL MEDICINE

## 2023-03-07 PROCEDURE — 99214 OFFICE O/P EST MOD 30 MIN: CPT | Mod: S$PBB,,, | Performed by: INTERNAL MEDICINE

## 2023-03-07 PROCEDURE — 99999 PR PBB SHADOW E&M-EST. PATIENT-LVL III: CPT | Mod: PBBFAC,,, | Performed by: INTERNAL MEDICINE

## 2023-03-07 PROCEDURE — 99213 OFFICE O/P EST LOW 20 MIN: CPT | Mod: PBBFAC,PO | Performed by: INTERNAL MEDICINE

## 2023-03-07 PROCEDURE — 99214 PR OFFICE/OUTPT VISIT, EST, LEVL IV, 30-39 MIN: ICD-10-PCS | Mod: S$PBB,,, | Performed by: INTERNAL MEDICINE

## 2023-03-07 RX ORDER — ROSUVASTATIN CALCIUM 10 MG/1
10 TABLET, COATED ORAL DAILY
Qty: 30 TABLET | Refills: 2 | Status: SHIPPED | OUTPATIENT
Start: 2023-03-07 | End: 2023-09-20 | Stop reason: SDUPTHER

## 2023-03-07 RX ORDER — FLUTICASONE PROPIONATE 50 MCG
1 SPRAY, SUSPENSION (ML) NASAL DAILY
Refills: 0
Start: 2023-03-07 | End: 2023-07-12

## 2023-03-07 RX ORDER — PANTOPRAZOLE SODIUM 20 MG/1
20 TABLET, DELAYED RELEASE ORAL NIGHTLY
Qty: 30 TABLET | Refills: 2 | Status: SHIPPED | OUTPATIENT
Start: 2023-03-07 | End: 2023-07-12

## 2023-03-07 NOTE — Clinical Note
José Miguel Valentino - could you help obtain his [facility] colonoscopy records? Thanks!

## 2023-03-07 NOTE — PROGRESS NOTES
Chief Complaint  Chief Complaint   Patient presents with    Follow-up     6th mth check up       HPI  Maile Harris is a 68 y.o. male with pmh of bph, gout, DLD, OA, glaucoma, HTN and presents for 6 month follow up.     BPH  Notes waking up twice a night to urinate. Seldomly finds himself urinating 30 minutes after going to the restroom. Denies hematuria.     Glaucoma  Right eye removed when 4 years old  Denies ocular pain or excessive tearing at this time.    Gout  Previously in the right toe. Denies any recent flare ups.     Lower back pain  [facility] but [facility] a [facility] instead of three times a week due to staffing issues. He notes worsening pain in his lower back this morning.     Chronic cough  Ongoing for multiple years. Feels the need to clear throat, denies chest symptoms. Cough worse at night. He did not take Protonix. States he  cannot use intranasal steroids and antihistamines due to glaucoma.     Notes infrequent knee weakness. Denies any knee pain.     Colonscopy done at [facility] 2022  PAST MEDICAL HISTORY:  Past Medical History:   Diagnosis Date    Glaucoma     Hypertension        PAST SURGICAL HISTORY:  History reviewed. No pertinent surgical history.    SOCIAL HISTORY:  Social History     Socioeconomic History    Marital status:    Tobacco Use    Smoking status: Former    Smokeless tobacco: Never   Substance and Sexual Activity    Alcohol use: Yes     Alcohol/week: 3.0 standard drinks     Types: 3 Cans of beer per week     Comment: 3 BEERS DAILY    Drug use: Never       FAMILY HISTORY:  History reviewed. No pertinent family history.    ALLERGIES AND MEDICATIONS: updated and reviewed.  Review of patient's allergies indicates:   Allergen Reactions    Lisinopril Other (See Comments)     COUGH     Current Outpatient Medications   Medication Sig Dispense Refill    allopurinoL (ZYLOPRIM) 100 MG tablet TAKE 1 TABLET BY MOUTH EVERY DAY 90 tablet 1    COMBIGAN 0.2-0.5 % Drop Place 1 drop  "into both eyes 2 (two) times daily.      dorzolamide (TRUSOPT) 2 % ophthalmic solution Place into both eyes.      indapamide (LOZOL) 1.25 MG Tab Take 1 tablet (1.25 mg total) by mouth once daily. 90 tablet 3    latanoprost 0.005 % ophthalmic solution Place 1 drop into both eyes every evening.      metoprolol succinate (TOPROL-XL) 25 MG 24 hr tablet Take 25 mg by mouth once daily.      tadalafil (CIALIS) 10 MG tablet Take 10 mg by mouth once daily.      fluticasone propionate (FLONASE) 50 mcg/actuation nasal spray 1 spray (50 mcg total) by Each Nostril route once daily.  0    pantoprazole (PROTONIX) 20 MG tablet Take 1 tablet (20 mg total) by mouth every evening. 30 tablet 2    rosuvastatin (CRESTOR) 10 MG tablet Take 1 tablet (10 mg total) by mouth once daily. 30 tablet 2    tadalafiL (CIALIS) 20 MG Tab Take as needed for erectile dysfunction       No current facility-administered medications for this visit.         ROS  Review of Systems   Constitutional:  Negative for chills, fatigue and fever.   HENT:  Negative for congestion, postnasal drip, sinus pressure, sinus pain and sneezing.    Eyes:  Positive for redness. Negative for pain, discharge and itching.   Respiratory:  Positive for cough. Negative for choking, chest tightness, shortness of breath and wheezing.    Gastrointestinal:  Negative for abdominal pain.   Genitourinary:  Positive for frequency. Negative for dysuria, flank pain and urgency.   Musculoskeletal:  Positive for back pain.        Lower back pain   Neurological:  Negative for dizziness and headaches.   Psychiatric/Behavioral: Negative.           Physical Exam  Vitals:    03/07/23 0951   BP: 134/62   Pulse: (!) 48   Temp: 98.6 °F (37 °C)   TempSrc: Oral   SpO2: 97%   Weight: 98.9 kg (218 lb 0.6 oz)   Height: 5' 11" (1.803 m)    Body mass index is 30.41 kg/m².  Weight: 98.9 kg (218 lb 0.6 oz)   Height: 5' 11" (180.3 cm)   Physical Exam  Constitutional:       Appearance: He is obese.   HENT:      " Head: Normocephalic.      Nose: No congestion or rhinorrhea.      Mouth/Throat:      Mouth: Mucous membranes are moist.      Pharynx: No oropharyngeal exudate or posterior oropharyngeal erythema.   Eyes:      Comments: Left eye erythema conjunctiva   Neck:      Vascular: No carotid bruit.   Cardiovascular:      Rate and Rhythm: Normal rate and regular rhythm.      Pulses: Normal pulses.      Heart sounds: Normal heart sounds.   Pulmonary:      Effort: Pulmonary effort is normal.      Breath sounds: Normal breath sounds. No wheezing.   Chest:      Chest wall: No tenderness.   Abdominal:      Palpations: Abdomen is soft.   Musculoskeletal:         General: Swelling present.      Right lower leg: No edema.      Left lower leg: No edema.      Comments: 5/5 strength bilaterally, patellar reflexes equal    Skin:     General: Skin is warm.   Neurological:      General: No focal deficit present.      Mental Status: He is alert.   Psychiatric:         Mood and Affect: Mood normal.         Behavior: Behavior normal.         Health Maintenance         Date Due Completion Date    TETANUS VACCINE Never done ---    Colorectal Cancer Screening Never done ---    Shingles Vaccine (1 of 2) Never done ---    Pneumococcal Vaccines (Age 65+) (1 - PCV) Never done ---    Influenza Vaccine (1) 09/01/2022 11/4/2021    Hemoglobin A1c (Prediabetes) 02/28/2024 2/28/2023    Lipid Panel 02/28/2028 2/28/2023              Assessment and Plan:      Chronic bilateral low back pain without sciatica  Saw [facility] 11/10/2022  Counseled importance of regular exercise and avoidance prolonged immobility. Gave packet showing various stretches and exercises to help alleviate back pain.     Benign cardiac murmur  Asymptomatic     Sinus bradycardia  No lightheadedness or weakness at this time    Chronic cough  Cough typically occurs after dinner at night likely etiology of silent reflux with possible;e contributions of post nasal drip.   -      pantoprazole (PROTONIX) 20 MG tablet; Take 1 tablet (20 mg total) by mouth every evening.  Dispense: 30 tablet; Refill: 2    Dyslipidemia  Lipid panel results within normal limits  -     rosuvastatin (CRESTOR) 10 MG tablet; Take 1 tablet (10 mg total) by mouth once daily.  Dispense: 30 tablet; Refill: 2    Idiopathic gout, unspecified chronicity, unspecified site  No recent flare ups  - Continue allopurnol 100mg    Obesity (BMI 30.41 kg/m2)  Prediabetes  HbA1c 2/28: 6.1%  Counseled healthy diet, avoidance of added sugar, 30 minutes of moderate exercise 5 times a week.   - repeat HbA1c 6 months      Allergic rhinitis due to other allergic trigger, unspecified seasonality  Mild enlargement of nasal turbinates with mild cobblestoning of the throat  May be contributing to chronic cough, encouraged usage of nasal steroid  -     fluticasone propionate (FLONASE) 50 mcg/actuation nasal spray; 1 spray (50 mcg total) by Each Nostril route once daily.; Refill: 0    Essential hypertension  BP controlled presently - reviewed anti-hypertensive regimen - continue current therapy  - continue indapamide 1.25mg tab once daily  - continue metoprolol succinate 25mg once daily    Glaucoma   Following with optho, no complaints at this time  Continue  - combigan .2-.6% drop  - dorzolamide 2% opthalmic    - latabnoprost .005% opthalmic    Erectile Dysfunction  -Continue tadalafil 20mg prn    Need for pneumoccocal vaccine   - will receive vaccine at pharmacy    Patient recently obtained colonoscopy at [facility] - will obtain records     Juan Miguel Danae MS4    I hereby acknowledge that I am relying upon documentation authored by a medical student working under my supervision and further I hereby attest that I have verified the student documentation or findings by personally performing the physical exam and medical decision making activities of the Evaluation and Management service to be billed.    Lamberto Wolfe MD

## 2023-03-08 ENCOUNTER — PATIENT OUTREACH (OUTPATIENT)
Dept: ADMINISTRATIVE | Facility: HOSPITAL | Age: 68
End: 2023-03-08
Payer: COMMERCIAL

## 2023-03-09 ENCOUNTER — PATIENT OUTREACH (OUTPATIENT)
Dept: ADMINISTRATIVE | Facility: HOSPITAL | Age: 68
End: 2023-03-09
Payer: COMMERCIAL

## 2023-05-27 ENCOUNTER — HOSPITAL ENCOUNTER (EMERGENCY)
Facility: HOSPITAL | Age: 68
Discharge: HOME | End: 2023-05-27
Attending: EMERGENCY MEDICINE
Payer: MEDICARE

## 2023-05-27 VITALS
TEMPERATURE: 98 F | BODY MASS INDEX: 30.38 KG/M2 | OXYGEN SATURATION: 99 % | SYSTOLIC BLOOD PRESSURE: 162 MMHG | HEART RATE: 42 BPM | RESPIRATION RATE: 18 BRPM | DIASTOLIC BLOOD PRESSURE: 77 MMHG | WEIGHT: 217.81 LBS

## 2023-05-27 DIAGNOSIS — I10 ESSENTIAL HYPERTENSION: ICD-10-CM

## 2023-05-27 DIAGNOSIS — R07.9 CHEST PAIN: Primary | ICD-10-CM

## 2023-05-27 LAB
ALBUMIN SERPL BCP-MCNC: 4 G/DL (ref 3.5–5.2)
ALP SERPL-CCNC: 80 U/L (ref 55–135)
ALT SERPL W/O P-5'-P-CCNC: 25 U/L (ref 10–44)
ANION GAP SERPL CALC-SCNC: 10 MMOL/L (ref 8–16)
AST SERPL-CCNC: 19 U/L (ref 10–40)
BASOPHILS # BLD AUTO: 0.04 K/UL (ref 0–0.2)
BASOPHILS NFR BLD: 0.8 % (ref 0–1.9)
BILIRUB SERPL-MCNC: 0.5 MG/DL (ref 0.1–1)
BNP SERPL-MCNC: 26 PG/ML (ref 0–99)
BUN SERPL-MCNC: 10 MG/DL (ref 8–23)
CALCIUM SERPL-MCNC: 9.2 MG/DL (ref 8.7–10.5)
CHLORIDE SERPL-SCNC: 100 MMOL/L (ref 95–110)
CO2 SERPL-SCNC: 23 MMOL/L (ref 23–29)
CREAT SERPL-MCNC: 1 MG/DL (ref 0.5–1.4)
DIFFERENTIAL METHOD: ABNORMAL
EOSINOPHIL # BLD AUTO: 0.1 K/UL (ref 0–0.5)
EOSINOPHIL NFR BLD: 1.9 % (ref 0–8)
ERYTHROCYTE [DISTWIDTH] IN BLOOD BY AUTOMATED COUNT: 12.4 % (ref 11.5–14.5)
EST. GFR  (NO RACE VARIABLE): >60 ML/MIN/1.73 M^2
GLUCOSE SERPL-MCNC: 102 MG/DL (ref 70–110)
HCT VFR BLD AUTO: 40.1 % (ref 40–54)
HCV AB SERPL QL IA: NEGATIVE
HEP C VIRUS HOLD SPECIMEN: NORMAL
HGB BLD-MCNC: 13.7 G/DL (ref 14–18)
IMM GRANULOCYTES # BLD AUTO: 0 K/UL (ref 0–0.04)
IMM GRANULOCYTES NFR BLD AUTO: 0 % (ref 0–0.5)
LYMPHOCYTES # BLD AUTO: 1.5 K/UL (ref 1–4.8)
LYMPHOCYTES NFR BLD: 28.4 % (ref 18–48)
MCH RBC QN AUTO: 29.3 PG (ref 27–31)
MCHC RBC AUTO-ENTMCNC: 34.2 G/DL (ref 32–36)
MCV RBC AUTO: 86 FL (ref 82–98)
MONOCYTES # BLD AUTO: 0.5 K/UL (ref 0.3–1)
MONOCYTES NFR BLD: 9.3 % (ref 4–15)
NEUTROPHILS # BLD AUTO: 3.1 K/UL (ref 1.8–7.7)
NEUTROPHILS NFR BLD: 59.6 % (ref 38–73)
NRBC BLD-RTO: 0 /100 WBC
PLATELET # BLD AUTO: 255 K/UL (ref 150–450)
PMV BLD AUTO: 9 FL (ref 9.2–12.9)
POTASSIUM SERPL-SCNC: 3.8 MMOL/L (ref 3.5–5.1)
PROT SERPL-MCNC: 7.3 G/DL (ref 6–8.4)
RBC # BLD AUTO: 4.68 M/UL (ref 4.6–6.2)
SODIUM SERPL-SCNC: 133 MMOL/L (ref 136–145)
TROPONIN I SERPL DL<=0.01 NG/ML-MCNC: <0.006 NG/ML (ref 0–0.03)
TROPONIN I SERPL DL<=0.01 NG/ML-MCNC: <0.006 NG/ML (ref 0–0.03)
WBC # BLD AUTO: 5.17 K/UL (ref 3.9–12.7)

## 2023-05-27 PROCEDURE — 93010 EKG 12-LEAD: ICD-10-PCS | Mod: ,,, | Performed by: INTERNAL MEDICINE

## 2023-05-27 PROCEDURE — 84484 ASSAY OF TROPONIN QUANT: CPT | Mod: 91 | Performed by: EMERGENCY MEDICINE

## 2023-05-27 PROCEDURE — 85025 COMPLETE CBC W/AUTO DIFF WBC: CPT | Performed by: EMERGENCY MEDICINE

## 2023-05-27 PROCEDURE — 93010 ELECTROCARDIOGRAM REPORT: CPT | Mod: ,,, | Performed by: INTERNAL MEDICINE

## 2023-05-27 PROCEDURE — 80053 COMPREHEN METABOLIC PANEL: CPT | Performed by: EMERGENCY MEDICINE

## 2023-05-27 PROCEDURE — 93005 ELECTROCARDIOGRAM TRACING: CPT

## 2023-05-27 PROCEDURE — 83880 ASSAY OF NATRIURETIC PEPTIDE: CPT | Performed by: EMERGENCY MEDICINE

## 2023-05-27 PROCEDURE — 86803 HEPATITIS C AB TEST: CPT | Performed by: EMERGENCY MEDICINE

## 2023-05-27 PROCEDURE — 99285 EMERGENCY DEPT VISIT HI MDM: CPT | Mod: 25

## 2023-05-27 PROCEDURE — 87389 HIV-1 AG W/HIV-1&-2 AB AG IA: CPT | Performed by: EMERGENCY MEDICINE

## 2023-05-27 NOTE — ED PROVIDER NOTES
SCRIBE #1 NOTE: I, Danna Hagan, am scribing for, and in the presence of, Chelsy Whatley DO. I have scribed the entire note.       History     Chief Complaint   Patient presents with    Chest Pain     Chest pain started 2 hours ago, describes as tightness, denies shortness of breath.     Review of patient's allergies indicates:   Allergen Reactions    Lisinopril Other (See Comments)     COUGH         History of Present Illness     HPI    5/27/2023, 10:30 AM  History obtained from the daughter and patient      History of Present Illness: Maile Harris is a 68 y.o. male patient with a PMHx of HTN who presents to the Emergency Department for evaluation of an episode of chest tightness across chest which onset suddenly when he stood up 2.5 hours ago. Pt states she episode lasted about 10 seconds then improved spontaneously and he has not felt the pain since. Pt also reports a similar episode of chest tightness yesterday, but states it was not as painful as today. Pt also reports a persistent cough since starting lisinopril, stating the cough persisted even after his doctor took him off of the medication. Pt's daughter states pt's heart rate is normally in the 50s. Pt reports he has not smoked ion over 40 years. Symptoms are intermittent and mild in severity. No mitigating or exacerbating factors reported. Associated sxs include chronic dry cough secondary to lisinopril. Patient denies any Sob, N/V, lightheadedness, dizziness, diaphoresis, and all other sxs at this time. No further complaints or concerns at this time.       Arrival mode: Personal vehicle      PCP: Lamberto Wolfe MD        Past Medical History:  Past Medical History:   Diagnosis Date    Glaucoma     Hypertension        Past Surgical History:  No past surgical history on file.      Family History:  No family history on file.    Social History:  Social History     Tobacco Use    Smoking status: Former    Smokeless tobacco: Never   Substance and Sexual  Activity    Alcohol use: Yes     Alcohol/week: 3.0 standard drinks     Types: 3 Cans of beer per week     Comment: 3 BEERS DAILY    Drug use: Never    Sexual activity: Not on file        Review of Systems     Review of Systems   Constitutional:  Negative for diaphoresis.   Respiratory:  Positive for cough and chest tightness. Negative for shortness of breath.    Gastrointestinal:  Negative for nausea and vomiting.   Neurological:  Negative for dizziness and light-headedness.    Physical Exam     Initial Vitals [05/27/23 1013]   BP Pulse Resp Temp SpO2   (!) 174/74 (!) 56 16 98.4 °F (36.9 °C) 96 %      MAP       --          Physical Exam  Nursing Notes and Vital Signs Reviewed.  Constitutional: Patient is in no acute distress. Well-developed and well-nourished.  Head: Atraumatic. Normocephalic.  Eyes: Right eye prosthesis.  Left pupil round and reactive. EOM intact. Conjunctivae are not pale.   ENT: Mucous membranes are moist. Oropharynx is clear and symmetric.    Neck: Supple. Full ROM. No lymphadenopathy.  Cardiovascular: Bradycardic. Regular rhythm.  Pulmonary/Chest: No respiratory distress. Clear to auscultation bilaterally. No wheezing or rales.  Abdominal: Soft and non-distended.  There is no tenderness.  No rebound, guarding, or rigidity.   Genitourinary: No CVA tenderness  Musculoskeletal: Moves all extremities. No obvious deformities. No edema. No calf tenderness.  Skin: Warm and dry.  Neurological:  Alert, awake, and appropriate.  Normal speech.  No acute focal neurological deficits are appreciated.  Psychiatric: Normal affect. Good eye contact. Appropriate in content.     ED Course   Procedures  ED Vital Signs:  Vitals:    05/27/23 1013 05/27/23 1027 05/27/23 1034 05/27/23 1119   BP: (!) 174/74  (!) 198/89 (!) 162/79   Pulse: (!) 56 (!) 56 (!) 45 (!) 44   Resp: 16  20 20   Temp: 98.4 °F (36.9 °C)      TempSrc: Oral      SpO2: 96%  100% 97%   Weight: 98.8 kg (217 lb 13 oz)       05/27/23 1235 05/27/23 1340    BP: (!) 152/73 (!) 162/77   Pulse: (!) 43 (!) 42   Resp: 20 18   Temp:     TempSrc:     SpO2: 100% 99%   Weight:         Abnormal Lab Results:  Labs Reviewed   CBC W/ AUTO DIFFERENTIAL - Abnormal; Notable for the following components:       Result Value    Hemoglobin 13.7 (*)     MPV 9.0 (*)     All other components within normal limits    Narrative:     Release to patient->Immediate   COMPREHENSIVE METABOLIC PANEL - Abnormal; Notable for the following components:    Sodium 133 (*)     All other components within normal limits    Narrative:     Release to patient->Immediate   HEPATITIS C ANTIBODY    Narrative:     Release to patient->Immediate   HEP C VIRUS HOLD SPECIMEN    Narrative:     Release to patient->Immediate   TROPONIN I    Narrative:     Release to patient->Immediate   TROPONIN I   B-TYPE NATRIURETIC PEPTIDE    Narrative:     Release to patient->Immediate   HIV 1 / 2 ANTIBODY        All Lab Results:  Results for orders placed or performed during the hospital encounter of 05/27/23   Hepatitis C Antibody   Result Value Ref Range    Hepatitis C Ab Negative Negative   HCV Virus Hold Specimen   Result Value Ref Range    HEP C Virus Hold Specimen Hold for HCV sendout    CBC auto differential   Result Value Ref Range    WBC 5.17 3.90 - 12.70 K/uL    RBC 4.68 4.60 - 6.20 M/uL    Hemoglobin 13.7 (L) 14.0 - 18.0 g/dL    Hematocrit 40.1 40.0 - 54.0 %    MCV 86 82 - 98 fL    MCH 29.3 27.0 - 31.0 pg    MCHC 34.2 32.0 - 36.0 g/dL    RDW 12.4 11.5 - 14.5 %    Platelets 255 150 - 450 K/uL    MPV 9.0 (L) 9.2 - 12.9 fL    Immature Granulocytes 0.0 0.0 - 0.5 %    Gran # (ANC) 3.1 1.8 - 7.7 K/uL    Immature Grans (Abs) 0.00 0.00 - 0.04 K/uL    Lymph # 1.5 1.0 - 4.8 K/uL    Mono # 0.5 0.3 - 1.0 K/uL    Eos # 0.1 0.0 - 0.5 K/uL    Baso # 0.04 0.00 - 0.20 K/uL    nRBC 0 0 /100 WBC    Gran % 59.6 38.0 - 73.0 %    Lymph % 28.4 18.0 - 48.0 %    Mono % 9.3 4.0 - 15.0 %    Eosinophil % 1.9 0.0 - 8.0 %    Basophil % 0.8 0.0 - 1.9 %     Differential Method Automated    Comprehensive metabolic panel   Result Value Ref Range    Sodium 133 (L) 136 - 145 mmol/L    Potassium 3.8 3.5 - 5.1 mmol/L    Chloride 100 95 - 110 mmol/L    CO2 23 23 - 29 mmol/L    Glucose 102 70 - 110 mg/dL    BUN 10 8 - 23 mg/dL    Creatinine 1.0 0.5 - 1.4 mg/dL    Calcium 9.2 8.7 - 10.5 mg/dL    Total Protein 7.3 6.0 - 8.4 g/dL    Albumin 4.0 3.5 - 5.2 g/dL    Total Bilirubin 0.5 0.1 - 1.0 mg/dL    Alkaline Phosphatase 80 55 - 135 U/L    AST 19 10 - 40 U/L    ALT 25 10 - 44 U/L    Anion Gap 10 8 - 16 mmol/L    eGFR >60 >60 mL/min/1.73 m^2   Troponin I #1   Result Value Ref Range    Troponin I <0.006 0.000 - 0.026 ng/mL   Troponin I #2   Result Value Ref Range    Troponin I <0.006 0.000 - 0.026 ng/mL   BNP   Result Value Ref Range    BNP 26 0 - 99 pg/mL         Imaging Results:  Imaging Results              X-Ray Chest AP Portable (Final result)  Result time 05/27/23 10:53:53      Final result by Timoteo Hartman MD (05/27/23 10:53:53)                   Impression:      No acute findings.      Electronically signed by: Timoteo Hartman MD  Date:    05/27/2023  Time:    10:53               Narrative:    EXAMINATION:  XR CHEST AP PORTABLE    CLINICAL HISTORY:  Acute chest pain., Chest Pain;    COMPARISON:  None    FINDINGS:  Heart size is normal. The lung fields are clear. No acute cardiopulmonary infiltrate.                                       The EKG was ordered, reviewed, and independently interpreted by the ED provider.  Interpretation time: 10:14  Rate: 43 BPM  Rhythm: sinus bradycardia  Interpretation: No acute ST or T wave abnormalities. No STEMI.           The Emergency Provider reviewed the vital signs and test results, which are outlined above.     ED Discussion     1:33 PM: Reassessed pt at this time. Discussed with pt all pertinent ED information and results. Discussed pt dx and plan of tx. Gave pt all f/u and return to the ED instructions. All questions and  concerns were addressed at this time. Pt expresses understanding of information and instructions, and is comfortable with plan to discharge. Pt is stable for discharge.    I discussed with patient and/or family/caretaker that evaluation in the ED does not suggest any emergent or life threatening medical conditions requiring immediate intervention beyond what was provided in the ED, and I believe patient is safe for discharge.  Regardless, an unremarkable evaluation in the ED does not preclude the development or presence of a serious of life threatening condition. As such, patient was instructed to return immediately for any worsening or change in current symptoms.      ED Course as of 05/27/23 1818   Sat May 27, 2023   1813 68-year-old male who presents with intermittent chest pain that last for several seconds and resolved spontaneously.  This occurred yesterday and today.  He has been chest pain-free the entire time in the emergency department.  He has chronic sinus bradycardia.  EKG shows no ischemic changes and troponin x2 effectively rules out ACS in a patient with a heart score of 3.  Chest x-ray negative for pneumothorax and there are no signs of widened mediastinum to indicate thoracic aortic dissection.  BNP negative for CHF.  He has no nausea or vomiting therefore ruptured esophagus unlikely.  Other than age she has no risk factors for PE and denies shortness of breath and has no hypoxia.  CBC shows no leukocytosis or significant anemia.  CMP shows no signs of renal failure, electrolyte abnormality, or liver failure.  Patient given an ambulatory referral to Cardiology.   [NF]      ED Course User Index  [NF] Chelsy Whatley DO     Medical Decision Making:   Clinical Tests:   Lab Tests: Ordered and Reviewed  Radiological Study: Ordered and Reviewed  Medical Tests: Ordered and Reviewed   Additional MDM:   Heart Score:    History:          Slightly suspicious.  ECG:             Normal  Age:               >65  years  Risk factors: 1-2 risk factors  Troponin:       Less than or equal to normal limit  Final Score: 3       ED Medication(s):  Medications - No data to display    Discharge Medication List as of 5/27/2023  1:41 PM           Follow-up Information       Lamberto Wolfe MD In 3 days.    Specialties: Internal Medicine, Pediatrics  Contact information:  [address] [address]  [phone]                                 Scribe Attestation:   Scribe #1: I performed the above scribed service and the documentation accurately describes the services I performed. I attest to the accuracy of the note.     Attending:   Physician Attestation Statement for Scribe #1: I, Chelsy Whatley DO, personally performed the services described in this documentation, as scribed by Danna Hagan, in my presence, and it is both accurate and complete.           Clinical Impression       ICD-10-CM ICD-9-CM   1. Chest pain  R07.9 786.50   2. Essential hypertension  I10 401.9       Disposition:   Disposition: Discharged  Condition: Stable       Chelsy Whatley DO  05/27/23 2688

## 2023-05-28 LAB — HIV 1+2 AB+HIV1 P24 AG SERPL QL IA: NEGATIVE

## 2023-06-12 ENCOUNTER — TELEPHONE (OUTPATIENT)
Dept: FAMILY MEDICINE | Facility: CLINIC | Age: 68
End: 2023-06-12
Payer: COMMERCIAL

## 2023-06-12 NOTE — TELEPHONE ENCOUNTER
----- Message from Tawanna Guevara sent at 6/12/2023  9:10 AM CDT -----  Regarding: Referral  Contact: LIZZIE HARRIS [[id number]]  Name of Who is Calling: Lizzie Harris            What is the request in detail:   Pt states he would like a recommendation to a  cardiologist, He  states [facility] .   Please advise          Can the clinic reply by [facility]:No           What Number to Call Back [facility]: Home Phone [phone]  [employer]          [phone]

## 2023-06-16 ENCOUNTER — DOCUMENTATION ONLY (OUTPATIENT)
Dept: REHABILITATION | Facility: HOSPITAL | Age: 68
End: 2023-06-16
Payer: COMMERCIAL

## 2023-06-16 NOTE — PROGRESS NOTES
Outpatient Physical Therapy Discharge Summary    Date: 06/16/2023      Date of PT eval: 10/11/22  Number of PT visits: 5  Date of last visit: 12/9/22  Cancel 11/21/22, 11/25/22, 12/2/22, 12/14/22 and 12/15/22    Assessment:  Unable to assess if goals met secondary to lack of attendance.     Plan: Discharge from outpatient physical therapy due to not attending.

## 2023-06-19 ENCOUNTER — TELEPHONE (OUTPATIENT)
Dept: FAMILY MEDICINE | Facility: CLINIC | Age: 68
End: 2023-06-19
Payer: COMMERCIAL

## 2023-06-19 DIAGNOSIS — I10 ESSENTIAL HYPERTENSION: ICD-10-CM

## 2023-06-19 DIAGNOSIS — R73.03 PREDIABETES: Primary | ICD-10-CM

## 2023-06-19 NOTE — TELEPHONE ENCOUNTER
----- Message from Corina Alvarado MA sent at 6/19/2023  3:17 PM CDT -----  Name of Who is Calling: LIZZIE SONI [[id number]]                What is the request in detail: Pt is requesting lab orders put in to get schedule a week before his appointment with dr galvez. Please call pt and schedule labs once they are put in.                Can the clinic reply by [facility]: No                What Number [facility] Back [facility]: [phone]

## 2023-06-20 NOTE — TELEPHONE ENCOUNTER
Labs signed    Also last recorded /77 - can we have patietn check home BP today? I may need to change medication

## 2023-06-22 ENCOUNTER — TELEPHONE (OUTPATIENT)
Dept: FAMILY MEDICINE | Facility: CLINIC | Age: 68
End: 2023-06-22
Payer: COMMERCIAL

## 2023-06-22 NOTE — TELEPHONE ENCOUNTER
----- Message from Corina Alvarado MA sent at 6/22/2023  3:47 PM CDT -----  Name of Who is Calling: Jyoti wife of LIZZIE SONI [[id number]]                What is the request in detail: Pt wife is requesting a call back to get recommendations for a cardiologist at the main [facility] to get scheduled, no one called to schedule a appointment. Please assist.                Can the clinic reply by [facility]: No                What Number [facility] Call Back [facility]: [phone]

## 2023-06-22 NOTE — TELEPHONE ENCOUNTER
----- Message from Soto Charlton sent at 6/22/2023  4:08 PM CDT -----  Regarding: Wife [phone]   Type: Patient Returning Call    Who Called:  Wife     Who Left Message for Patient: Breana Forde LPN    Does the patient know what this is regarding?:no     Would the patient rather a call back or a response via [facility]?  Call back     Best Call Back Number: .[phone]      Additional Information:     Thank you.

## 2023-06-29 ENCOUNTER — TELEPHONE (OUTPATIENT)
Dept: FAMILY MEDICINE | Facility: CLINIC | Age: 68
End: 2023-06-29
Payer: COMMERCIAL

## 2023-06-29 VITALS — DIASTOLIC BLOOD PRESSURE: 66 MMHG | SYSTOLIC BLOOD PRESSURE: 128 MMHG

## 2023-06-29 NOTE — TELEPHONE ENCOUNTER
----- Message from Mehul Marina sent at 6/29/2023  9:29 AM CDT -----  Type: Patient Call Back    Who called: Self     What is the request in detail: Stated he was told to give a call about his BP reading > Was taken today and read 128/66     Can the clinic reply by [facility]? No     Would the patient rather a call back or a response via [facility]? Call     Best call back number: [phone] (cell) [phone] (home)

## 2023-07-03 ENCOUNTER — LAB VISIT (OUTPATIENT)
Dept: LAB | Facility: HOSPITAL | Age: 68
End: 2023-07-03
Attending: INTERNAL MEDICINE
Payer: MEDICARE

## 2023-07-03 ENCOUNTER — TELEPHONE (OUTPATIENT)
Dept: FAMILY MEDICINE | Facility: CLINIC | Age: 68
End: 2023-07-03
Payer: COMMERCIAL

## 2023-07-03 DIAGNOSIS — I10 ESSENTIAL HYPERTENSION: ICD-10-CM

## 2023-07-03 DIAGNOSIS — R73.03 PREDIABETES: ICD-10-CM

## 2023-07-03 LAB
ALBUMIN SERPL BCP-MCNC: 3.9 G/DL (ref 3.5–5.2)
ALP SERPL-CCNC: 81 U/L (ref 55–135)
ALT SERPL W/O P-5'-P-CCNC: 20 U/L (ref 10–44)
ANION GAP SERPL CALC-SCNC: 9 MMOL/L (ref 8–16)
AST SERPL-CCNC: 24 U/L (ref 10–40)
BASOPHILS # BLD AUTO: 0.03 K/UL (ref 0–0.2)
BASOPHILS NFR BLD: 0.6 % (ref 0–1.9)
BILIRUB SERPL-MCNC: 0.5 MG/DL (ref 0.1–1)
BUN SERPL-MCNC: 12 MG/DL (ref 8–23)
CALCIUM SERPL-MCNC: 9.5 MG/DL (ref 8.7–10.5)
CHLORIDE SERPL-SCNC: 100 MMOL/L (ref 95–110)
CO2 SERPL-SCNC: 24 MMOL/L (ref 23–29)
CREAT SERPL-MCNC: 1 MG/DL (ref 0.5–1.4)
DIFFERENTIAL METHOD: ABNORMAL
EOSINOPHIL # BLD AUTO: 0.1 K/UL (ref 0–0.5)
EOSINOPHIL NFR BLD: 2.7 % (ref 0–8)
ERYTHROCYTE [DISTWIDTH] IN BLOOD BY AUTOMATED COUNT: 12.8 % (ref 11.5–14.5)
EST. GFR  (NO RACE VARIABLE): >60 ML/MIN/1.73 M^2
ESTIMATED AVG GLUCOSE: 126 MG/DL (ref 68–131)
GLUCOSE SERPL-MCNC: 103 MG/DL (ref 70–110)
HBA1C MFR BLD: 6 % (ref 4–5.6)
HCT VFR BLD AUTO: 40.7 % (ref 40–54)
HGB BLD-MCNC: 13.7 G/DL (ref 14–18)
IMM GRANULOCYTES # BLD AUTO: 0.01 K/UL (ref 0–0.04)
IMM GRANULOCYTES NFR BLD AUTO: 0.2 % (ref 0–0.5)
LYMPHOCYTES # BLD AUTO: 1.7 K/UL (ref 1–4.8)
LYMPHOCYTES NFR BLD: 33.3 % (ref 18–48)
MCH RBC QN AUTO: 29.7 PG (ref 27–31)
MCHC RBC AUTO-ENTMCNC: 33.7 G/DL (ref 32–36)
MCV RBC AUTO: 88 FL (ref 82–98)
MONOCYTES # BLD AUTO: 0.5 K/UL (ref 0.3–1)
MONOCYTES NFR BLD: 10.1 % (ref 4–15)
NEUTROPHILS # BLD AUTO: 2.7 K/UL (ref 1.8–7.7)
NEUTROPHILS NFR BLD: 53.1 % (ref 38–73)
NRBC BLD-RTO: 0 /100 WBC
PLATELET # BLD AUTO: 249 K/UL (ref 150–450)
PMV BLD AUTO: 9.7 FL (ref 9.2–12.9)
POTASSIUM SERPL-SCNC: 4 MMOL/L (ref 3.5–5.1)
PROT SERPL-MCNC: 7.3 G/DL (ref 6–8.4)
RBC # BLD AUTO: 4.61 M/UL (ref 4.6–6.2)
SODIUM SERPL-SCNC: 133 MMOL/L (ref 136–145)
WBC # BLD AUTO: 5.13 K/UL (ref 3.9–12.7)

## 2023-07-03 PROCEDURE — 85025 COMPLETE CBC W/AUTO DIFF WBC: CPT | Performed by: INTERNAL MEDICINE

## 2023-07-03 PROCEDURE — 83036 HEMOGLOBIN GLYCOSYLATED A1C: CPT | Performed by: INTERNAL MEDICINE

## 2023-07-03 PROCEDURE — 80053 COMPREHEN METABOLIC PANEL: CPT | Performed by: INTERNAL MEDICINE

## 2023-07-03 PROCEDURE — 36415 COLL VENOUS BLD VENIPUNCTURE: CPT | Mod: PO | Performed by: INTERNAL MEDICINE

## 2023-07-03 NOTE — TELEPHONE ENCOUNTER
----- Message from Mehul Marina sent at 7/3/2023  8:54 AM CDT -----  Type: Patient Call Back    Who called:Self     What is the request in detail: Asking for lab order for PSA level that wasn't included with the other labs .. Stated the blood was already taken for this order just need the order in for it     Can the clinic reply by [facility]? No     Would the patient rather a call back or a response via [facility]? CALL     Best call back number: [phone] (cell)

## 2023-07-03 NOTE — TELEPHONE ENCOUNTER
Call was placed to patient and MD message given related to PSA lab covered by insurance once a year only. Verbalized understanding and thank you.

## 2023-07-09 PROBLEM — R73.03 PREDIABETES: Status: ACTIVE | Noted: 2023-07-09

## 2023-07-09 PROBLEM — R07.9 CHEST PAIN OF UNKNOWN ETIOLOGY: Status: ACTIVE | Noted: 2023-07-09

## 2023-07-10 ENCOUNTER — HOSPITAL ENCOUNTER (OUTPATIENT)
Dept: CARDIOLOGY | Facility: CLINIC | Age: 68
Discharge: HOME | End: 2023-07-10
Payer: MEDICARE

## 2023-07-10 ENCOUNTER — TELEPHONE (OUTPATIENT)
Dept: CARDIOLOGY | Facility: CLINIC | Age: 68
End: 2023-07-10
Payer: COMMERCIAL

## 2023-07-10 ENCOUNTER — OFFICE VISIT (OUTPATIENT)
Dept: CARDIOLOGY | Facility: CLINIC | Age: 68
End: 2023-07-10
Payer: MEDICARE

## 2023-07-10 VITALS
HEART RATE: 57 BPM | OXYGEN SATURATION: 97 % | DIASTOLIC BLOOD PRESSURE: 62 MMHG | WEIGHT: 218.5 LBS | BODY MASS INDEX: 30.59 KG/M2 | SYSTOLIC BLOOD PRESSURE: 124 MMHG | HEIGHT: 71 IN

## 2023-07-10 DIAGNOSIS — R73.03 PREDIABETES: ICD-10-CM

## 2023-07-10 DIAGNOSIS — R07.9 CHEST PAIN OF UNKNOWN ETIOLOGY: Primary | ICD-10-CM

## 2023-07-10 DIAGNOSIS — R07.9 CHEST PAIN, UNSPECIFIED TYPE: Primary | ICD-10-CM

## 2023-07-10 DIAGNOSIS — E66.09 CLASS 1 OBESITY DUE TO EXCESS CALORIES WITHOUT SERIOUS COMORBIDITY WITH BODY MASS INDEX (BMI) OF 30.0 TO 30.9 IN ADULT: ICD-10-CM

## 2023-07-10 DIAGNOSIS — R07.9 CHEST PAIN, UNSPECIFIED TYPE: ICD-10-CM

## 2023-07-10 DIAGNOSIS — E78.5 HYPERLIPIDEMIA, UNSPECIFIED HYPERLIPIDEMIA TYPE: ICD-10-CM

## 2023-07-10 DIAGNOSIS — I10 ESSENTIAL HYPERTENSION: ICD-10-CM

## 2023-07-10 PROBLEM — E66.811 CLASS 1 OBESITY DUE TO EXCESS CALORIES WITHOUT SERIOUS COMORBIDITY WITH BODY MASS INDEX (BMI) OF 30.0 TO 30.9 IN ADULT: Status: ACTIVE | Noted: 2023-07-10

## 2023-07-10 PROCEDURE — 99214 OFFICE O/P EST MOD 30 MIN: CPT | Mod: PBBFAC | Performed by: INTERNAL MEDICINE

## 2023-07-10 PROCEDURE — 93005 ELECTROCARDIOGRAM TRACING: CPT | Mod: PBBFAC | Performed by: INTERNAL MEDICINE

## 2023-07-10 PROCEDURE — 99204 OFFICE O/P NEW MOD 45 MIN: CPT | Mod: S$PBB,,, | Performed by: INTERNAL MEDICINE

## 2023-07-10 PROCEDURE — 93010 EKG 12-LEAD: ICD-10-PCS | Mod: S$PBB,,, | Performed by: INTERNAL MEDICINE

## 2023-07-10 PROCEDURE — 99999 PR PBB SHADOW E&M-EST. PATIENT-LVL IV: CPT | Mod: PBBFAC,,, | Performed by: INTERNAL MEDICINE

## 2023-07-10 PROCEDURE — 99204 PR OFFICE/OUTPT VISIT, NEW, LEVL IV, 45-59 MIN: ICD-10-PCS | Mod: S$PBB,,, | Performed by: INTERNAL MEDICINE

## 2023-07-10 PROCEDURE — 99999 PR PBB SHADOW E&M-EST. PATIENT-LVL IV: ICD-10-PCS | Mod: PBBFAC,,, | Performed by: INTERNAL MEDICINE

## 2023-07-10 PROCEDURE — 93010 ELECTROCARDIOGRAM REPORT: CPT | Mod: S$PBB,,, | Performed by: INTERNAL MEDICINE

## 2023-07-10 NOTE — PROGRESS NOTES
Chart has been dictated using voice recognition software.  It is not been reviewed carefully for any transcriptional errors due to this technology.   Subjective:   Patient ID:  Maile Harris is a 68 y.o. male who presents for evaluation of Chest Pain      HPI: Patient with hypertension, hyperlipidemia, and prediabetes was seen in [place] [facility] on 27-May-2023 for chest pain lasting with no evidence of an MI.    Patient got up in the AM with chest pain like a soreness that lasted for 15 min.  No exertional chest pain.  Has not had recurrence of chest pain. Did not have dyspnea, diaphoresis, radiation, or nausea with this pain.  Patient denies any dyspnea at rest or on exertion, orthopnea, PND, or edema.  Patient denies any palpitations, lightheadedness, or syncope. Walks 3 miles in 1 hour 5 days a week. Patient denies lower extremity claudication.     Cardiac risk factors: pre-diabetes, hyperlipidemia, hypertension, positive family history, tobacco use (10 pack year history - quit 47 years ago)    Past Medical History:   Diagnosis Date    Glaucoma     Hypertension        History reviewed. No pertinent surgical history.    Social History     Tobacco Use    Smoking status: Former    Smokeless tobacco: Never   Substance Use Topics    Alcohol use: Yes     Alcohol/week: 3.0 standard drinks     Types: 3 Cans of beer per week     Comment: 3 BEERS DAILY    Drug use: Never       Outpatient Medications Prior to Visit   Medication Sig Dispense Refill    allopurinoL (ZYLOPRIM) 100 MG tablet TAKE 1 TABLET BY MOUTH EVERY DAY 90 tablet 1    COMBIGAN 0.2-0.5 % Drop Place 1 drop into both eyes 2 (two) times daily.      dorzolamide (TRUSOPT) 2 % ophthalmic solution Place into both eyes.      indapamide (LOZOL) 1.25 MG Tab Take 1 tablet (1.25 mg total) by mouth once daily. 90 tablet 3    latanoprost 0.005 % ophthalmic solution Place 1 drop into both eyes every evening.      metoprolol succinate (TOPROL-XL) 25 MG 24 hr tablet Take 25  "mg by mouth once daily.      rosuvastatin (CRESTOR) 10 MG tablet Take 1 tablet (10 mg total) by mouth once daily. 30 tablet 2    tadalafiL (CIALIS) 20 MG Tab Take as needed for erectile dysfunction      fluticasone propionate (FLONASE) 50 mcg/actuation nasal spray 1 spray (50 mcg total) by Each Nostril route once daily.  0    pantoprazole (PROTONIX) 20 MG tablet Take 1 tablet (20 mg total) by mouth every evening. 30 tablet 2    tadalafil (CIALIS) 10 MG tablet Take 10 mg by mouth once daily.       No facility-administered medications prior to visit.       Review of patient's allergies indicates:   Allergen Reactions    Lisinopril Other (See Comments)     COUGH       Review of Systems   Constitutional: Negative for weight gain and weight loss.   Respiratory:  Positive for cough (since he had Covid (June-2021)). Negative for hemoptysis.    Hematologic/Lymphatic: Negative for bleeding problem. Does not bruise/bleed easily.   Musculoskeletal:  Positive for joint pain (knee).   Gastrointestinal:  Negative for hematemesis and hematochezia.   Genitourinary:  Negative for hematuria.   Neurological:  Positive for loss of balance. Negative for aphonia, focal weakness, numbness and weakness.    Objective:   Physical Exam  Constitutional:       Appearance: He is well-developed. He is obese.      Comments: /62   Pulse (!) 57   Ht 5' 11" (1.803 m)   Wt 99.1 kg (218 lb 7.6 oz)   SpO2 97%   BMI 30.47 kg/m²      Neck:      Vascular: No carotid bruit or JVD.   Cardiovascular:      Rate and Rhythm: Normal rate and regular rhythm.      Pulses: Intact distal pulses.      Heart sounds: Murmur heard.   Systolic murmur is present. 1/6 early systolic ejection murmur at the lower left sternal border radiating to the right upper sternal border.     No friction rub. No gallop.   Pulmonary:      Effort: Pulmonary effort is normal.      Breath sounds: Normal breath sounds. No wheezing or rales.   Abdominal:      General: Bowel sounds " are normal. There is no abdominal bruit.      Palpations: Abdomen is soft. There is no hepatomegaly.      Tenderness: There is no abdominal tenderness.   Musculoskeletal:      Cervical back: Neck supple.      Right lower leg: No edema.      Left lower leg: No edema.   Skin:     Nails: There is no clubbing.   Neurological:      Mental Status: He is alert and oriented to person, place, and time.       Lab Results   Component Value Date    WBC 5.13 07/03/2023    HGB 13.7 (L) 07/03/2023    HCT 40.7 07/03/2023    MCV 88 07/03/2023     07/03/2023       Lab Results   Component Value Date     (L) 07/03/2023    K 4.0 07/03/2023    BUN 12 07/03/2023    CREATININE 1.0 07/03/2023     07/03/2023    HGBA1C 6.0 (H) 07/03/2023    CHOL 169 02/28/2023    HDL 62 02/28/2023    LDLCALC 90.6 02/28/2023    TRIG 82 02/28/2023    CHOLHDL 36.7 02/28/2023    HGB 13.7 (L) 07/03/2023    HCT 40.7 07/03/2023     07/03/2023     ECG (27-May-2023) showed marked sinus bradycardia and was an otherwise normal ECG.  Assessment:     1. Chest pain of unknown etiology    2. Prediabetes    3. Essential hypertension    4. Hyperlipidemia, unspecified hyperlipidemia type    5. Class 1 obesity due to excess calories without serious comorbidity with body mass index (BMI) of 30.0 to 30.9 in adult      At this time, Patient has no symptoms of cardiac ischemia, heart failure, or significant arrhythmias.  The patient's prior episode of chest discomfort was most likely noncardiac in origin.  As it has not recurred, no further evaluation is needed at this time.  If the chest discomfort recurs, the patient should undergo stress testing.  Patient's A1c is mildly elevated giving him the diagnosis of prediabetes and this is being managed by his primary care provider.  His blood pressure is well controlled at this time.  Given the patient has prediabetes, the patient's LDL cholesterol should be lower.  However, as the patient is committed to an  exercise program and weight loss, his lipid profile should be rechecked in 6 months.  If his A1c is still elevated, the goal for his lipids would be an LDL less than 70 mg/dL.    After discussion with the patient and his wife, no further cardiovascular testing or additional treatment is needed at this time. At this time, there is nothing further to add to this patient's care from a cardiovascular point of view. Unless the patient has further cardiovascular problems, there is no need for the patient to return for re-evaluation.  However, I would be happy to see the patient again if needed.       Plan:     Maile VEGA was seen today for chest pain.    Diagnoses and all orders for this visit:    Chest pain of unknown etiology    Prediabetes    Essential hypertension    Hyperlipidemia, unspecified hyperlipidemia type    Class 1 obesity due to excess calories without serious comorbidity with body mass index (BMI) of 30.0 to 30.9 in adult          Anish Hayes MD  Consultative Cardiology

## 2023-07-12 ENCOUNTER — HOSPITAL ENCOUNTER (OUTPATIENT)
Dept: RADIOLOGY | Facility: HOSPITAL | Age: 68
Discharge: HOME | End: 2023-07-12
Attending: INTERNAL MEDICINE
Payer: MEDICARE

## 2023-07-12 ENCOUNTER — PATIENT MESSAGE (OUTPATIENT)
Dept: FAMILY MEDICINE | Facility: CLINIC | Age: 68
End: 2023-07-12

## 2023-07-12 ENCOUNTER — OFFICE VISIT (OUTPATIENT)
Dept: FAMILY MEDICINE | Facility: CLINIC | Age: 68
End: 2023-07-12
Payer: MEDICARE

## 2023-07-12 VITALS
HEART RATE: 43 BPM | SYSTOLIC BLOOD PRESSURE: 124 MMHG | TEMPERATURE: 98 F | DIASTOLIC BLOOD PRESSURE: 60 MMHG | HEIGHT: 71 IN | BODY MASS INDEX: 30.57 KG/M2 | WEIGHT: 218.38 LBS | OXYGEN SATURATION: 96 %

## 2023-07-12 DIAGNOSIS — Z00.00 ROUTINE ADULT HEALTH MAINTENANCE: Primary | ICD-10-CM

## 2023-07-12 DIAGNOSIS — R00.1 SINUS BRADYCARDIA: ICD-10-CM

## 2023-07-12 DIAGNOSIS — G89.29 CHRONIC PAIN OF RIGHT KNEE: ICD-10-CM

## 2023-07-12 DIAGNOSIS — E78.5 DYSLIPIDEMIA: ICD-10-CM

## 2023-07-12 DIAGNOSIS — M25.561 CHRONIC PAIN OF RIGHT KNEE: ICD-10-CM

## 2023-07-12 DIAGNOSIS — I10 ESSENTIAL HYPERTENSION: ICD-10-CM

## 2023-07-12 DIAGNOSIS — Z23 NEED FOR PNEUMOCOCCAL VACCINATION: ICD-10-CM

## 2023-07-12 DIAGNOSIS — Z23 NEED FOR TD VACCINE: ICD-10-CM

## 2023-07-12 DIAGNOSIS — R73.03 PREDIABETES: ICD-10-CM

## 2023-07-12 PROCEDURE — 99214 OFFICE O/P EST MOD 30 MIN: CPT | Mod: 25,S$PBB,, | Performed by: INTERNAL MEDICINE

## 2023-07-12 PROCEDURE — 99213 OFFICE O/P EST LOW 20 MIN: CPT | Mod: PBBFAC,PO,25 | Performed by: INTERNAL MEDICINE

## 2023-07-12 PROCEDURE — 73562 XR KNEE ORTHO RIGHT: ICD-10-PCS | Mod: 26,RT,, | Performed by: RADIOLOGY

## 2023-07-12 PROCEDURE — 99999 PR PBB SHADOW E&M-EST. PATIENT-LVL III: ICD-10-PCS | Mod: PBBFAC,,, | Performed by: INTERNAL MEDICINE

## 2023-07-12 PROCEDURE — 73560 XR KNEE ORTHO RIGHT: ICD-10-PCS | Mod: 26,LT,, | Performed by: RADIOLOGY

## 2023-07-12 PROCEDURE — 99397 PER PM REEVAL EST PAT 65+ YR: CPT | Mod: S$PBB,,, | Performed by: INTERNAL MEDICINE

## 2023-07-12 PROCEDURE — 73560 X-RAY EXAM OF KNEE 1 OR 2: CPT | Mod: 26,LT,, | Performed by: RADIOLOGY

## 2023-07-12 PROCEDURE — 73560 X-RAY EXAM OF KNEE 1 OR 2: CPT | Mod: TC,FY,59,PO,LT

## 2023-07-12 PROCEDURE — 99397 PR PREVENTIVE VISIT,EST,65 & OVER: ICD-10-PCS | Mod: S$PBB,,, | Performed by: INTERNAL MEDICINE

## 2023-07-12 PROCEDURE — 99999 PR PBB SHADOW E&M-EST. PATIENT-LVL III: CPT | Mod: PBBFAC,,, | Performed by: INTERNAL MEDICINE

## 2023-07-12 PROCEDURE — 99214 PR OFFICE/OUTPT VISIT, EST, LEVL IV, 30-39 MIN: ICD-10-PCS | Mod: 25,S$PBB,, | Performed by: INTERNAL MEDICINE

## 2023-07-12 PROCEDURE — 73562 X-RAY EXAM OF KNEE 3: CPT | Mod: 26,RT,, | Performed by: RADIOLOGY

## 2023-07-12 PROCEDURE — 90677 PCV20 VACCINE IM: CPT | Mod: PBBFAC,PO

## 2023-07-12 NOTE — PROGRESS NOTES
Pneumo. 20 vaccine administered as ordered.  VIS given.  Tolerated well.  Told to wait in clinic for 15 mins.  Patient verbalized understanding.

## 2023-07-12 NOTE — PATIENT INSTRUCTIONS
HOLD METOPROLOL 25 mg for 1 week (call/email me next week Wednesday July 19th with BP readings for past week)    Call if /90 consistently

## 2023-07-12 NOTE — PROGRESS NOTES
Subjective:     Chief Complaint   Patient presents with    Annual Exam       HPI  Maile Harris is a 68 y.o. male with medical diagnoses as listed in the medical history and problem list that presents for above complaint(s).    Here for annual examination. Trying to modify diet to improve overall heatlh. Cutting down sugar-sweetened beverages and down to 2 beers a day - down from 5 a day. Walking up to 3 miles a day with his wife. Seeing Ophthalmology in August 2023    Heart rate is persistently on lower end - he is on beta blocker therapy. Recent chest pain evaluation per Cardiology reassuring.    Patient Care Team:  Lamebrto Wolfe MD as PCP - General (Internal Medicine)  [facility] (Gastroenterology)  Khai Giles MA as Care Coordinator      PAST MEDICAL HISTORY:  Past Medical History:   Diagnosis Date    Glaucoma     Hypertension        PAST SURGICAL HISTORY:  History reviewed. No pertinent surgical history.    SOCIAL HISTORY:  Social History     Socioeconomic History    Marital status:    Tobacco Use    Smoking status: Former    Smokeless tobacco: Never   Substance and Sexual Activity    Alcohol use: Yes     Alcohol/week: 3.0 standard drinks     Types: 3 Cans of beer per week     Comment: 3 BEERS DAILY    Drug use: Never       FAMILY HISTORY:  History reviewed. No pertinent family history.    ALLERGIES AND MEDICATIONS: updated and reviewed.  Review of patient's allergies indicates:   Allergen Reactions    Lisinopril Other (See Comments)     COUGH     Current Outpatient Medications   Medication Sig Dispense Refill    allopurinoL (ZYLOPRIM) 100 MG tablet TAKE 1 TABLET BY MOUTH EVERY DAY 90 tablet 1    COMBIGAN 0.2-0.5 % Drop Place 1 drop into both eyes 2 (two) times daily.      dorzolamide (TRUSOPT) 2 % ophthalmic solution Place into both eyes.      indapamide (LOZOL) 1.25 MG Tab Take 1 tablet (1.25 mg total) by mouth once daily. 90 tablet 3     "latanoprost 0.005 % ophthalmic solution Place 1 drop into both eyes every evening.      metoprolol succinate (TOPROL-XL) 25 MG 24 hr tablet Take 25 mg by mouth once daily.      pantoprazole (PROTONIX) 20 MG tablet Take 1 tablet (20 mg total) by mouth every evening. 30 tablet 2    rosuvastatin (CRESTOR) 10 MG tablet Take 1 tablet (10 mg total) by mouth once daily. 30 tablet 2    tadalafiL (CIALIS) 20 MG Tab Take as needed for erectile dysfunction      fluticasone propionate (FLONASE) 50 mcg/actuation nasal spray 1 spray (50 mcg total) by Each Nostril route once daily.  0    tadalafil (CIALIS) 10 MG tablet Take 10 mg by mouth once daily.       No current facility-administered medications for this visit.         Objective:       Physical Exam  Vitals:    07/12/23 0826   BP: 124/60   Pulse: (!) 43   Temp: 98.1 °F (36.7 °C)   TempSrc: Oral   SpO2: 96%   Weight: 99 kg (218 lb 5.9 oz)   Height: 5' 11" (1.803 m)    Body mass index is 30.46 kg/m².  Weight: 99 kg (218 lb 5.9 oz)   Height: 5' 11" (180.3 cm)   Physical Exam  Vitals reviewed.   Constitutional:       General: He is not in acute distress.     Appearance: Normal appearance. He is well-developed.   HENT:      Head: Normocephalic and atraumatic.      Right Ear: External ear normal.      Left Ear: External ear normal.      Nose: Nose normal. No congestion.      Mouth/Throat:      Mouth: Mucous membranes are moist.      Pharynx: No oropharyngeal exudate.   Eyes:      General:         Right eye: No discharge.         Left eye: No discharge.      Extraocular Movements: Extraocular movements intact.      Pupils: Pupils are equal, round, and reactive to light.   Neck:      Thyroid: No thyromegaly.   Cardiovascular:      Rate and Rhythm: Normal rate and regular rhythm.      Heart sounds: Normal heart sounds. No murmur heard.    No gallop.   Pulmonary:      Effort: Pulmonary effort is normal. No respiratory distress.      Breath sounds: Normal breath sounds. No stridor. "   Abdominal:      General: Bowel sounds are normal. There is no distension.      Palpations: Abdomen is soft. There is no mass.      Tenderness: There is no abdominal tenderness. There is no guarding.      Hernia: No hernia is present.   Musculoskeletal:         General: No tenderness or signs of injury. Normal range of motion.      Cervical back: Normal range of motion and neck supple.      Right lower leg: No edema.      Left lower leg: No edema.   Skin:     General: Skin is warm and dry.      Coloration: Skin is not jaundiced.      Findings: No bruising, erythema, lesion or rash.   Neurological:      Mental Status: He is alert.      Cranial Nerves: No cranial nerve deficit.   Psychiatric:         Mood and Affect: Mood normal.         Behavior: Behavior normal.           Assessment:     1. Routine adult health maintenance    2. Dyslipidemia    3. Essential hypertension    4. Need for Td vaccine    5. Need for pneumococcal vaccination    6. Prediabetes    7. Chronic pain of right knee    8. Sinus bradycardia      Plan:     Maile Harris was seen today for routine physical. Separate E/M Code for acute conditions discussed during today's routine physical examination have been documented in the assessment and plan below.  Diagnoses and all orders for this visit:    Routine adult health maintenance  Discussed healthy diet, regular exercise, necessary labs, age appropriate cancer screening, and routine vaccinations.      Dyslipidemia  Lipids controlled presently - reviewed anti-hyperlipidemic regimen - continue current therapy    Essential hypertension  BP controlled presently - will continue indapamide and hold metoprolol given sinus bradycaria    Need for pneumococcal vaccination  Risks and benefits of pneumococcal vaccination discussed - administered.  -     (In Office Administered) Pneumococcal Conjugate Vaccine (20 Valent) (IM)    Prediabetes  Counseled regarding indications for diabetes mellitus screening and  hemoglobin A1c surveillance. Dietary modification discussed    Chronic pain of right knee  Considerations include patellofemoral syndrome  Given home exercises to perform   Obtain knee imaging  -     X-ray Knee Ortho Right; Future    Sinus bradycardia  Obtain TSH - will additionally hold beta blocker for the next week or so (continue indapamide)   -     TSH; Future        Health Maintenance reviewed, addressed as per orders    F/u in 3-6 months    I spent 40 minutes reviewing the patient's chart, talking to family/caregiver(s), coordinating care with other providers/specialists and time spent on documentation       1. The patient indicates understanding of these issues and agrees with the plan. Brief care plan is updated and reviewed with the patient as applicable.   2. The patient is given an After Visit Summary that lists all medications with directions, allergies, orders placed during this encounter and follow-up instructions.   3. I have reviewed the patient's medical information including past medical, family, and social history sections including the medications and allergies.   4. We discussed the patient's current medications. I reconciled the patient's medication list and prepared and supplied needed refills.       Lamberto Wolfe MD  Internal Medicine-Pediatrics

## 2023-09-06 ENCOUNTER — PATIENT MESSAGE (OUTPATIENT)
Dept: FAMILY MEDICINE | Facility: CLINIC | Age: 68
End: 2023-09-06
Payer: COMMERCIAL

## 2023-09-20 DIAGNOSIS — E78.5 DYSLIPIDEMIA: ICD-10-CM

## 2023-09-20 RX ORDER — ROSUVASTATIN CALCIUM 10 MG/1
10 TABLET, COATED ORAL DAILY
Qty: 30 TABLET | Refills: 2 | Status: SHIPPED | OUTPATIENT
Start: 2023-09-20 | End: 2023-12-17

## 2023-09-20 NOTE — TELEPHONE ENCOUNTER
No care due was identified.  [facility] Embedded Care Due Messages. Reference number: [id number].   9/20/2023 10:29:10 AM CDT

## 2023-09-20 NOTE — TELEPHONE ENCOUNTER
----- Message from Niya Vargas MA sent at 9/20/2023 10:21 AM CDT -----  Type: Patient Call Back    Who called: Self    What is the request in detail: pt. Was informed by the pharmacy his refill request was denied .. He would like to know why..     rosuvastatin (CRESTOR) 10 MG tablet    Can the clinic reply by [facility]?NO    Would the patient rather a call back or a response via [facility]? Yes, call     Best call back number: [phone]

## 2023-10-05 ENCOUNTER — PATIENT OUTREACH (OUTPATIENT)
Dept: ADMINISTRATIVE | Facility: HOSPITAL | Age: 68
End: 2023-10-05
Payer: COMMERCIAL

## 2023-11-27 ENCOUNTER — TELEPHONE (OUTPATIENT)
Dept: FAMILY MEDICINE | Facility: CLINIC | Age: 68
End: 2023-11-27
Payer: COMMERCIAL

## 2023-11-27 DIAGNOSIS — I10 ESSENTIAL HYPERTENSION: Primary | ICD-10-CM

## 2023-11-27 NOTE — TELEPHONE ENCOUNTER
----- Message from Rebecca Evans sent at 11/27/2023 10:38 AM CST -----  Type: Lab    Caller is requesting to schedule their Lab appointment prior to annual appointment.    Order is not listed in EPIC.  Please enter order and contact patient to schedule.    Name of Caller: wife    Preferred Date and Time of Labs    Date of [facility] Appointment: 12/6/2023    Where would they like the lab performed?[facility]     Would the patient rather a call back or a response via [facility]? call    Best Call Back Number:.[phone] (home)

## 2023-11-30 ENCOUNTER — LAB VISIT (OUTPATIENT)
Dept: LAB | Facility: HOSPITAL | Age: 68
End: 2023-11-30
Attending: INTERNAL MEDICINE
Payer: MEDICARE

## 2023-11-30 DIAGNOSIS — I10 ESSENTIAL HYPERTENSION: ICD-10-CM

## 2023-11-30 LAB
ALBUMIN SERPL BCP-MCNC: 3.7 G/DL (ref 3.5–5.2)
ALP SERPL-CCNC: 84 U/L (ref 55–135)
ALT SERPL W/O P-5'-P-CCNC: 25 U/L (ref 10–44)
ANION GAP SERPL CALC-SCNC: 12 MMOL/L (ref 8–16)
AST SERPL-CCNC: 22 U/L (ref 10–40)
BILIRUB SERPL-MCNC: 0.5 MG/DL (ref 0.1–1)
BUN SERPL-MCNC: 9 MG/DL (ref 8–23)
CALCIUM SERPL-MCNC: 9.2 MG/DL (ref 8.7–10.5)
CHLORIDE SERPL-SCNC: 100 MMOL/L (ref 95–110)
CO2 SERPL-SCNC: 22 MMOL/L (ref 23–29)
CREAT SERPL-MCNC: 1 MG/DL (ref 0.5–1.4)
EST. GFR  (NO RACE VARIABLE): >60 ML/MIN/1.73 M^2
ESTIMATED AVG GLUCOSE: 131 MG/DL (ref 68–131)
GLUCOSE SERPL-MCNC: 111 MG/DL (ref 70–110)
HBA1C MFR BLD: 6.2 % (ref 4–5.6)
POTASSIUM SERPL-SCNC: 3.9 MMOL/L (ref 3.5–5.1)
PROT SERPL-MCNC: 7 G/DL (ref 6–8.4)
SODIUM SERPL-SCNC: 134 MMOL/L (ref 136–145)
TSH SERPL DL<=0.005 MIU/L-ACNC: 2.13 UIU/ML (ref 0.4–4)

## 2023-11-30 PROCEDURE — 36415 COLL VENOUS BLD VENIPUNCTURE: CPT | Mod: PO | Performed by: INTERNAL MEDICINE

## 2023-11-30 PROCEDURE — 80053 COMPREHEN METABOLIC PANEL: CPT | Performed by: INTERNAL MEDICINE

## 2023-11-30 PROCEDURE — 84443 ASSAY THYROID STIM HORMONE: CPT | Performed by: INTERNAL MEDICINE

## 2023-11-30 PROCEDURE — 83036 HEMOGLOBIN GLYCOSYLATED A1C: CPT | Performed by: INTERNAL MEDICINE

## 2023-12-04 ENCOUNTER — OFFICE VISIT (OUTPATIENT)
Dept: FAMILY MEDICINE | Facility: CLINIC | Age: 68
End: 2023-12-04
Payer: MEDICARE

## 2023-12-04 VITALS
OXYGEN SATURATION: 98 % | SYSTOLIC BLOOD PRESSURE: 136 MMHG | TEMPERATURE: 98 F | DIASTOLIC BLOOD PRESSURE: 62 MMHG | BODY MASS INDEX: 30.76 KG/M2 | HEART RATE: 47 BPM | HEIGHT: 71 IN | WEIGHT: 219.69 LBS

## 2023-12-04 DIAGNOSIS — E66.09 CLASS 1 OBESITY DUE TO EXCESS CALORIES WITH SERIOUS COMORBIDITY AND BODY MASS INDEX (BMI) OF 30.0 TO 30.9 IN ADULT: ICD-10-CM

## 2023-12-04 DIAGNOSIS — R73.03 PREDIABETES: ICD-10-CM

## 2023-12-04 DIAGNOSIS — R05.3 CHRONIC COUGH: ICD-10-CM

## 2023-12-04 DIAGNOSIS — H40.9 GLAUCOMA, UNSPECIFIED GLAUCOMA TYPE, UNSPECIFIED LATERALITY: ICD-10-CM

## 2023-12-04 DIAGNOSIS — Z12.5 ENCOUNTER FOR PROSTATE CANCER SCREENING: ICD-10-CM

## 2023-12-04 DIAGNOSIS — J30.9 CHRONIC ALLERGIC RHINITIS: ICD-10-CM

## 2023-12-04 DIAGNOSIS — I10 ESSENTIAL HYPERTENSION: Primary | ICD-10-CM

## 2023-12-04 PROCEDURE — 99999 PR PBB SHADOW E&M-EST. PATIENT-LVL III: CPT | Mod: PBBFAC,,, | Performed by: INTERNAL MEDICINE

## 2023-12-04 PROCEDURE — 99215 OFFICE O/P EST HI 40 MIN: CPT | Mod: S$PBB,,, | Performed by: INTERNAL MEDICINE

## 2023-12-04 PROCEDURE — 99215 PR OFFICE/OUTPT VISIT, EST, LEVL V, 40-54 MIN: ICD-10-PCS | Mod: S$PBB,,, | Performed by: INTERNAL MEDICINE

## 2023-12-04 PROCEDURE — 99213 OFFICE O/P EST LOW 20 MIN: CPT | Mod: PBBFAC,PO | Performed by: INTERNAL MEDICINE

## 2023-12-04 PROCEDURE — 99999 PR PBB SHADOW E&M-EST. PATIENT-LVL III: ICD-10-PCS | Mod: PBBFAC,,, | Performed by: INTERNAL MEDICINE

## 2023-12-04 RX ORDER — LOSARTAN POTASSIUM 25 MG/1
25 TABLET ORAL DAILY
Qty: 90 TABLET | Refills: 3 | Status: SHIPPED | OUTPATIENT
Start: 2023-12-04 | End: 2024-12-03

## 2023-12-04 RX ORDER — AZELASTINE 1 MG/ML
1 SPRAY, METERED NASAL 2 TIMES DAILY
Qty: 30 ML | Refills: 2 | Status: SHIPPED | OUTPATIENT
Start: 2023-12-04 | End: 2024-12-03

## 2023-12-04 NOTE — PROGRESS NOTES
Subjective:     Chief Complaint   Patient presents with    Follow-up       HPI  Maile Harris is a 68 y.o. male with medical diagnoses as listed in the medical history and problem list that presents for above complaint(s).    Resumed beta-blocker therapy after brief cessation to examine effect on patient's longstanding bradycardia  Patient has noted bradycardia has persisted with heart rates generally in the 40s and 50s  No dizziness/lightheadedness    Cough - lingering - particularly at nighttime  Does seldom have clear mucous production  No SOB or chest tightness  Does have acid reflux  Also has taken Zyrtec  (?symptoms of post nasal drip)      Patient Care Team:  Lamberto Wolfe MD as PCP - General (Internal Medicine)  [facility] (Gastroenterology)  Khai Giles MA as Care Coordinator      PAST MEDICAL HISTORY:  Past Medical History:   Diagnosis Date    Glaucoma     Hypertension        PAST SURGICAL HISTORY:  History reviewed. No pertinent surgical history.    SOCIAL HISTORY:  Social History     Socioeconomic History    Marital status:    Tobacco Use    Smoking status: Former    Smokeless tobacco: Never   Substance and Sexual Activity    Alcohol use: Yes     Alcohol/week: 3.0 standard drinks of alcohol     Types: 3 Cans of beer per week     Comment: 3 BEERS DAILY    Drug use: Never       FAMILY HISTORY:  History reviewed. No pertinent family history.    ALLERGIES AND MEDICATIONS: updated and reviewed.  Review of patient's allergies indicates:   Allergen Reactions    Lisinopril Other (See Comments)     COUGH     Current Outpatient Medications   Medication Sig Dispense Refill    allopurinoL (ZYLOPRIM) 100 MG tablet TAKE 1 TABLET BY MOUTH EVERY DAY 90 tablet 1    COMBIGAN 0.2-0.5 % Drop Place 1 drop into both eyes 2 (two) times daily.      dorzolamide (TRUSOPT) 2 % ophthalmic solution Place into both eyes.      indapamide (LOZOL) 1.25 MG Tab Take 1  "tablet (1.25 mg total) by mouth once daily. 90 tablet 3    latanoprost 0.005 % ophthalmic solution Place 1 drop into both eyes every evening.      rosuvastatin (CRESTOR) 10 MG tablet Take 1 tablet (10 mg total) by mouth once daily. 30 tablet 2    tadalafiL (CIALIS) 20 MG Tab Take as needed for erectile dysfunction      azelastine (ASTELIN) 137 mcg (0.1 %) nasal spray 1 spray (137 mcg total) by Nasal route 2 (two) times daily. 30 mL 2    losartan (COZAAR) 25 MG tablet Take 1 tablet (25 mg total) by mouth once daily. 90 tablet 3    pantoprazole (PROTONIX) 20 MG tablet Take 1 tablet (20 mg total) by mouth every evening. 30 tablet 2     No current facility-administered medications for this visit.         Objective:       Physical Exam  Vitals:    12/04/23 1520   BP: 136/62   Pulse: (!) 47   Temp: 97.9 °F (36.6 °C)   TempSrc: Oral   SpO2: 98%   Weight: 99.6 kg (219 lb 11 oz)   Height: 5' 11" (1.803 m)    Body mass index is 30.64 kg/m².  Weight: 99.6 kg (219 lb 11 oz)   Height: 5' 11" (180.3 cm)   Physical Exam  Vitals reviewed.   Constitutional:       Appearance: He is well-developed.   HENT:      Head: Normocephalic and atraumatic.      Nose:      Comments: Nasal turbinate hypertrophy bilaterally  Eyes:      Conjunctiva/sclera: Conjunctivae normal.   Cardiovascular:      Rate and Rhythm: Normal rate.      Heart sounds: No murmur heard.     No gallop.   Pulmonary:      Effort: Pulmonary effort is normal.      Breath sounds: No wheezing or rales.   Musculoskeletal:      Cervical back: Normal range of motion and neck supple.   Lymphadenopathy:      Cervical: No cervical adenopathy.   Skin:     General: Skin is warm and dry.      Findings: No rash.   Neurological:      Mental Status: He is alert.      Cranial Nerves: No cranial nerve deficit.   Psychiatric:         Mood and Affect: Mood normal.         Behavior: Behavior normal.             Assessment:     1. Essential hypertension    2. Class 1 obesity due to excess " calories with serious comorbidity and body mass index (BMI) of 30.0 to 30.9 in adult    3. Chronic cough    4. Chronic allergic rhinitis    5. Prediabetes    6. Glaucoma, unspecified glaucoma type, unspecified laterality    7. Encounter for prostate cancer screening      Plan:     Maile VEGA was seen today for follow-up.    Diagnoses and all orders for this visit:    Essential hypertension  bradycardia  We discussed consist continuation of beta-blocker therapy given no clear indication and patient's persistent bradycardia  Patient will recheck blood pressure readings at home and relay to clinic  Obtain electrolytes subsequent visit  -     losartan (COZAAR) 25 MG tablet; Take 1 tablet (25 mg total) by mouth once daily.  -     Basic Metabolic Panel; Future    Class 1 obesity due to excess calories with serious comorbidity and body mass index (BMI) of 30.0 to 30.9 in adult  Body mass index is 30.64 kg/m².  -     Lipid Panel; Future    Chronic cough  Chronic allergic rhinitis  Will trial antihistamine for postnasal drip symptomatology as noted  -     azelastine (ASTELIN) 137 mcg (0.1 %) nasal spray; 1 spray (137 mcg total) by Nasal route 2 (two) times daily.    Prediabetes  Counseled regarding indications for diabetes mellitus screening and hemoglobin A1c ordered.  -     Hemoglobin A1C; Future    Glaucoma, unspecified glaucoma type, unspecified laterality  Continue current regimen per Optometry    Encounter for prostate cancer screening  The natural history of prostate cancer and ongoing controversy regarding screening and potential treatment outcomes of prostate cancer has been discussed with the patient. The meaning of a false positive PSA and a false negative PSA has been discussed. He indicates understanding of the limitations of this screening test and wishes  to proceed with screening PSA testing.  -     PSA, Screening; Future        Health Maintenance reviewed, addressed as per orders    I spent 40 minutes  reviewing the patient's chart, talking to family/caregiver(s), coordinating care with other providers/specialists and time spent on documentation     F/u in 4-6 months      1. The patient indicates understanding of these issues and agrees with the plan. Brief care plan is updated and reviewed with the patient as applicable.   2. The patient is given an After Visit Summary that lists all medications with directions, allergies, orders placed during this encounter and follow-up instructions.   3. I have reviewed the patient's medical information including past medical, family, and social history sections including the medications and allergies.   4. We discussed the patient's current medications. I reconciled the patient's medication list and prepared and supplied needed refills.       Lamberto Wolfe MD  Internal Medicine-Pediatrics

## 2023-12-17 DIAGNOSIS — E78.5 DYSLIPIDEMIA: ICD-10-CM

## 2023-12-17 RX ORDER — ROSUVASTATIN CALCIUM 10 MG/1
10 TABLET, COATED ORAL
Qty: 90 TABLET | Refills: 0 | Status: SHIPPED | OUTPATIENT
Start: 2023-12-17 | End: 2024-03-22 | Stop reason: SDUPTHER

## 2023-12-17 NOTE — TELEPHONE ENCOUNTER
Refill Decision Note   Maile Harris  is requesting a refill authorization.  Brief Assessment and Rationale for Refill:  Approve     Medication Therapy Plan:       Medication Reconciliation Completed: No   Comments:     No Care Gaps recommended.     Note composed:4:29 PM 12/17/2023

## 2024-01-25 RX ORDER — ALLOPURINOL 100 MG/1
TABLET ORAL
Qty: 90 TABLET | Refills: 0 | Status: SHIPPED | OUTPATIENT
Start: 2024-01-25 | End: 2024-04-30

## 2024-01-25 NOTE — TELEPHONE ENCOUNTER
No care due was identified.  [facility] Embedded Care Due Messages. Reference number: [id number].   1/25/2024 3:52:47 AM CST

## 2024-01-25 NOTE — TELEPHONE ENCOUNTER
Refill Decision Note   Shanevitor VEGA Steven  is requesting a refill authorization.  Brief Assessment and Rationale for Refill:  Approve     Medication Therapy Plan:         Comments:     Note composed:1:59 PM 01/25/2024             Appointments     Last Visit   12/4/2023 Lamberto Wolfe MD   Next Visit   6/7/2024 Lamberto Wolfe MD

## 2024-02-12 NOTE — Clinical Note
Thank you for referring Mindymand[employer]itor Harris for evaluation of chest pain of unknown etiology. Please see my note for details of this encounter. If you have any questions, please contact me.  Thank you again for the referral.  Dr. Chang Dr. Chang

## 2024-02-27 DIAGNOSIS — Z00.00 ENCOUNTER FOR MEDICARE ANNUAL WELLNESS EXAM: ICD-10-CM

## 2024-03-21 DIAGNOSIS — E78.5 DYSLIPIDEMIA: ICD-10-CM

## 2024-03-22 RX ORDER — ROSUVASTATIN CALCIUM 10 MG/1
10 TABLET, COATED ORAL
Qty: 90 TABLET | Refills: 3 | Status: SHIPPED | OUTPATIENT
Start: 2024-03-22

## 2024-03-22 NOTE — TELEPHONE ENCOUNTER
Refill Routing Note   Medication(s) are not appropriate for processing by [facility] for the following reason(s):        Required labs outdated    [facility] action(s):  Defer        Medication Therapy Plan: FLOS 06/03/24      Appointments  past 12m or future 3m with PCP    Date Provider   Last Visit   12/4/2023 Lamberto Wolfe MD   Next Visit   6/7/2024 Lamberto Wolfe MD   ED visits in past 90 days: 0        Note composed:4:20 AM 03/22/2024

## 2024-03-22 NOTE — TELEPHONE ENCOUNTER
Care Due:                  Date            Visit Type   Department     Provider  --------------------------------------------------------------------------------                                [facility]                              PRIMARY      MED/ INTERNAL  Last Visit: 12-      CARE ([facility])   MED/ GA[facility]      Lamberto Wolfe                              [facility]      MED/ INTERNAL  Next Visit: 06-      CARE ([facility])   MED/ YUNIOR Wolfe                                                            Last  Test          Frequency    Reason                     Performed    Due Date  --------------------------------------------------------------------------------    Lipid Panel.  12 months..  rosuvastatin.............  02- 02-    Uric Acid...  12 months..  allopurinoL..............  02- 02-    [facility] Embedded Care Due Messages. Reference number: [id number].   3/21/2024 8:29:35 PM CDT

## 2024-04-29 NOTE — TELEPHONE ENCOUNTER
Care Due:                  Date            Visit Type   Department     Provider  --------------------------------------------------------------------------------                                [facility]                              PRIMARY      MED/ INTERNAL  Last Visit: 12-      CARE ([facility])   MED/ YUNIOR Wolfe                              [facility]                              PRIMARY      MED/ INTERNAL  Next Visit: 06-      CARE ([facility])   MED/ YUNIOR Wolfe                                                            Last  Test          Frequency    Reason                     Performed    Due Date  --------------------------------------------------------------------------------    CBC.........  12 months..  allopurinoL..............  07- 06-    [facility] Embedded Care Due Messages. Reference number: [id number].   4/29/2024 4:30:52 PM CDT

## 2024-04-30 RX ORDER — ALLOPURINOL 100 MG/1
TABLET ORAL
Qty: 90 TABLET | Refills: 0 | Status: SHIPPED | OUTPATIENT
Start: 2024-04-30

## 2024-04-30 NOTE — TELEPHONE ENCOUNTER
Refill Routing Note   Medication(s) are not appropriate for processing by [facility] for the following reason(s):        Required labs outdated    [facility] action(s):  Defer     Requires labs : Yes             Appointments  past 12m or future 3m with PCP    Date Provider   Last Visit   12/4/2023 Lamberto Wolfe MD   Next Visit   6/7/2024 Lamberto Wolfe MD   ED visits in past 90 days: 0        Note composed:4:19 PM 04/30/2024

## 2024-05-22 DIAGNOSIS — J30.9 CHRONIC ALLERGIC RHINITIS: ICD-10-CM

## 2024-05-22 RX ORDER — AZELASTINE 1 MG/ML
1 SPRAY, METERED NASAL 2 TIMES DAILY
Qty: 90 ML | Refills: 1 | Status: SHIPPED | OUTPATIENT
Start: 2024-05-22 | End: 2024-06-07

## 2024-05-24 ENCOUNTER — PATIENT OUTREACH (OUTPATIENT)
Dept: ADMINISTRATIVE | Facility: HOSPITAL | Age: 69
End: 2024-05-24
Payer: COMMERCIAL

## 2024-06-03 ENCOUNTER — LAB VISIT (OUTPATIENT)
Dept: LAB | Facility: HOSPITAL | Age: 69
End: 2024-06-03
Attending: INTERNAL MEDICINE
Payer: MEDICARE

## 2024-06-03 DIAGNOSIS — E66.09 CLASS 1 OBESITY DUE TO EXCESS CALORIES WITH SERIOUS COMORBIDITY AND BODY MASS INDEX (BMI) OF 30.0 TO 30.9 IN ADULT: ICD-10-CM

## 2024-06-03 DIAGNOSIS — R73.03 PREDIABETES: ICD-10-CM

## 2024-06-03 DIAGNOSIS — Z12.5 ENCOUNTER FOR PROSTATE CANCER SCREENING: ICD-10-CM

## 2024-06-03 DIAGNOSIS — I10 ESSENTIAL HYPERTENSION: ICD-10-CM

## 2024-06-03 LAB
ANION GAP SERPL CALC-SCNC: 10 MMOL/L (ref 8–16)
BUN SERPL-MCNC: 13 MG/DL (ref 8–23)
CALCIUM SERPL-MCNC: 9.8 MG/DL (ref 8.7–10.5)
CHLORIDE SERPL-SCNC: 98 MMOL/L (ref 95–110)
CHOLEST SERPL-MCNC: 144 MG/DL (ref 120–199)
CHOLEST/HDLC SERPL: 2.8 {RATIO} (ref 2–5)
CO2 SERPL-SCNC: 25 MMOL/L (ref 23–29)
COMPLEXED PSA SERPL-MCNC: 2.9 NG/ML (ref 0–4)
CREAT SERPL-MCNC: 1.1 MG/DL (ref 0.5–1.4)
EST. GFR  (NO RACE VARIABLE): >60 ML/MIN/1.73 M^2
ESTIMATED AVG GLUCOSE: 128 MG/DL (ref 68–131)
GLUCOSE SERPL-MCNC: 99 MG/DL (ref 70–110)
HBA1C MFR BLD: 6.1 % (ref 4–5.6)
HDLC SERPL-MCNC: 51 MG/DL (ref 40–75)
HDLC SERPL: 35.4 % (ref 20–50)
LDLC SERPL CALC-MCNC: 78.6 MG/DL (ref 63–159)
NONHDLC SERPL-MCNC: 93 MG/DL
POTASSIUM SERPL-SCNC: 3.9 MMOL/L (ref 3.5–5.1)
SODIUM SERPL-SCNC: 133 MMOL/L (ref 136–145)
TRIGL SERPL-MCNC: 72 MG/DL (ref 30–150)

## 2024-06-03 PROCEDURE — 80048 BASIC METABOLIC PNL TOTAL CA: CPT | Performed by: INTERNAL MEDICINE

## 2024-06-03 PROCEDURE — 80061 LIPID PANEL: CPT | Performed by: INTERNAL MEDICINE

## 2024-06-03 PROCEDURE — 84153 ASSAY OF PSA TOTAL: CPT | Performed by: INTERNAL MEDICINE

## 2024-06-03 PROCEDURE — 36415 COLL VENOUS BLD VENIPUNCTURE: CPT | Mod: PO | Performed by: INTERNAL MEDICINE

## 2024-06-03 PROCEDURE — 83036 HEMOGLOBIN GLYCOSYLATED A1C: CPT | Performed by: INTERNAL MEDICINE

## 2024-06-07 ENCOUNTER — OFFICE VISIT (OUTPATIENT)
Dept: FAMILY MEDICINE | Facility: CLINIC | Age: 69
End: 2024-06-07
Payer: MEDICARE

## 2024-06-07 VITALS
HEIGHT: 71 IN | WEIGHT: 218.06 LBS | BODY MASS INDEX: 30.53 KG/M2 | TEMPERATURE: 98 F | HEART RATE: 48 BPM | DIASTOLIC BLOOD PRESSURE: 60 MMHG | OXYGEN SATURATION: 99 % | SYSTOLIC BLOOD PRESSURE: 104 MMHG

## 2024-06-07 DIAGNOSIS — R73.03 PREDIABETES: ICD-10-CM

## 2024-06-07 DIAGNOSIS — G47.00 INSOMNIA, UNSPECIFIED TYPE: ICD-10-CM

## 2024-06-07 DIAGNOSIS — M10.00 IDIOPATHIC GOUT, UNSPECIFIED CHRONICITY, UNSPECIFIED SITE: ICD-10-CM

## 2024-06-07 DIAGNOSIS — R41.9 UNSPECIFIED SYMPTOMS AND SIGNS INVOLVING COGNITIVE FUNCTIONS AND AWARENESS: ICD-10-CM

## 2024-06-07 DIAGNOSIS — I10 ESSENTIAL HYPERTENSION: ICD-10-CM

## 2024-06-07 DIAGNOSIS — Z00.00 ROUTINE ADULT HEALTH MAINTENANCE: Primary | ICD-10-CM

## 2024-06-07 DIAGNOSIS — H40.9 GLAUCOMA, UNSPECIFIED GLAUCOMA TYPE, UNSPECIFIED LATERALITY: ICD-10-CM

## 2024-06-07 DIAGNOSIS — Z23 NEED FOR SHINGLES VACCINE: ICD-10-CM

## 2024-06-07 DIAGNOSIS — R00.1 SINUS BRADYCARDIA: ICD-10-CM

## 2024-06-07 PROCEDURE — 99214 OFFICE O/P EST MOD 30 MIN: CPT | Mod: S$PBB,25,, | Performed by: INTERNAL MEDICINE

## 2024-06-07 PROCEDURE — 99999 PR PBB SHADOW E&M-EST. PATIENT-LVL III: CPT | Mod: PBBFAC,,, | Performed by: INTERNAL MEDICINE

## 2024-06-07 PROCEDURE — 90750 HZV VACC RECOMBINANT IM: CPT | Mod: PBBFAC,PO

## 2024-06-07 PROCEDURE — 90471 IMMUNIZATION ADMIN: CPT | Mod: PBBFAC,PO

## 2024-06-07 PROCEDURE — 99397 PER PM REEVAL EST PAT 65+ YR: CPT | Mod: GZ,S$PBB,, | Performed by: INTERNAL MEDICINE

## 2024-06-07 PROCEDURE — 99999PBSHW PR PBB SHADOW TECHNICAL ONLY FILED TO HB: Mod: PBBFAC,,,

## 2024-06-07 PROCEDURE — 99213 OFFICE O/P EST LOW 20 MIN: CPT | Mod: PBBFAC,PO,25 | Performed by: INTERNAL MEDICINE

## 2024-06-07 RX ORDER — TRAZODONE HYDROCHLORIDE 50 MG/1
TABLET ORAL
Qty: 45 TABLET | Refills: 0 | Status: SHIPPED | OUTPATIENT
Start: 2024-06-07

## 2024-06-07 RX ORDER — ACETAZOLAMIDE 250 MG/1
250 TABLET ORAL 2 TIMES DAILY
COMMUNITY
Start: 2024-06-06

## 2024-06-07 RX ADMIN — ZOSTER VACCINE RECOMBINANT, ADJUVANTED 0.5 ML: KIT at 11:06

## 2024-06-07 NOTE — PROGRESS NOTES
Subjective:     Chief Complaint   Patient presents with    Annual Exam       HPI  aMile Harris is a 69 y.o. male with medical diagnoses as listed in the medical history and problem list that presents for above complaint(s).    Glaucoma  New medication for glaucoma - acetazolamide  Hasn't started yet - 30 days with follow-up appointment due       Patient Care Team:  Lamberto Wolfe MD as PCP - General (Internal Medicine)  [facility], [facility] (Gastroenterology)  Khai Giles MA as Care Coordinator      PAST MEDICAL HISTORY:  Past Medical History:   Diagnosis Date    Glaucoma     Hypertension        PAST SURGICAL HISTORY:  No past surgical history on file.    SOCIAL HISTORY:  Social History     Socioeconomic History    Marital status:    Tobacco Use    Smoking status: Former    Smokeless tobacco: Never   Substance and Sexual Activity    Alcohol use: Yes     Alcohol/week: 3.0 standard drinks of alcohol     Types: 3 Cans of beer per week     Comment: 3 BEERS DAILY    Drug use: Never     Social Determinants of Health     Financial Resource Strain: Low Risk  (6/2/2024)    Overall Financial Resource Strain (CARDIA)     Difficulty of Paying Living Expenses: Not hard at all   Food Insecurity: No Food Insecurity (6/2/2024)    Hunger Vital Sign     Worried About Running Out of Food in the Last Year: Never true     Ran Out of Food in the Last Year: Never true   Transportation Needs: No Transportation Needs (12/3/2023)    Received from [facility], [facility]    PRAPARE - Transportation     Lack of Transportation (Medical): No     Lack of Transportation (Non-Medical): No   Physical Activity: Sufficiently Active (6/2/2024)    Exercise Vital Sign     Days of Exercise per Week: 5 days     Minutes of Exercise per Session: 60 min   Stress: Stress Concern Present (6/2/2024)    [employer] - Occupational Stress Questionnaire      "Feeling of Stress : To some extent   Housing Stability: Unknown (6/2/2024)    Housing Stability Vital Sign     Unable to Pay for Housing in the Last Year: No       FAMILY HISTORY:  No family history on file.    ALLERGIES AND MEDICATIONS: updated and reviewed.  Review of patient's allergies indicates:   Allergen Reactions    Lisinopril Other (See Comments)     COUGH     Current Outpatient Medications   Medication Sig Dispense Refill    acetaZOLAMIDE (DIAMOX) 250 MG tablet Take 250 mg by mouth 2 (two) times daily.      allopurinoL (ZYLOPRIM) 100 MG tablet TAKE 1 TABLET BY MOUTH EVERY DAY 90 tablet 0    COMBIGAN 0.2-0.5 % Drop Place 1 drop into both eyes 2 (two) times daily.      dorzolamide (TRUSOPT) 2 % ophthalmic solution Place into both eyes.      indapamide (LOZOL) 1.25 MG Tab TAKE 1 TABLET(1.25 MG) BY MOUTH EVERY DAY 90 tablet 3    latanoprost 0.005 % ophthalmic solution Place 1 drop into both eyes every evening.      losartan (COZAAR) 25 MG tablet Take 1 tablet (25 mg total) by mouth once daily. 90 tablet 3    rosuvastatin (CRESTOR) 10 MG tablet TAKE 1 TABLET(10 MG) BY MOUTH EVERY DAY 90 tablet 3    tadalafiL (CIALIS) 20 MG Tab Take as needed for erectile dysfunction      pantoprazole (PROTONIX) 20 MG tablet Take 1 tablet (20 mg total) by mouth every evening. 30 tablet 2    traZODone (DESYREL) 50 MG tablet Take 1-2 tablets nightly 45 tablet 0     No current facility-administered medications for this visit.         Objective:       Physical Exam  Vitals:    06/07/24 1044   BP: 104/60   Pulse: (!) 48   Temp: 97.9 °F (36.6 °C)   TempSrc: Oral   SpO2: 99%   Weight: 98.9 kg (218 lb 0.6 oz)   Height: 5' 11" (1.803 m)    Body mass index is 30.41 kg/m².  Weight: 98.9 kg (218 lb 0.6 oz)   Height: 5' 11" (180.3 cm)   Physical Exam  Vitals reviewed.   Constitutional:       General: He is not in acute distress.     Appearance: Normal appearance. He is well-developed.   HENT:      Head: Normocephalic and " atraumatic.      Right Ear: External ear normal.      Left Ear: External ear normal.      Nose: Nose normal. No congestion.      Mouth/Throat:      Mouth: Mucous membranes are moist.      Pharynx: No oropharyngeal exudate.   Eyes:      General:         Right eye: No discharge.         Left eye: No discharge.      Extraocular Movements: Extraocular movements intact.      Pupils: Pupils are equal, round, and reactive to light.   Neck:      Thyroid: No thyromegaly.   Cardiovascular:      Rate and Rhythm: Normal rate and regular rhythm.      Heart sounds: Normal heart sounds. No murmur heard.     No gallop.   Pulmonary:      Effort: Pulmonary effort is normal. No respiratory distress.      Breath sounds: Normal breath sounds. No stridor.   Abdominal:      General: Bowel sounds are normal. There is no distension.      Palpations: Abdomen is soft. There is no mass.      Tenderness: There is no abdominal tenderness. There is no guarding.      Hernia: No hernia is present.   Musculoskeletal:         General: No tenderness or signs of injury. Normal range of motion.      Cervical back: Normal range of motion and neck supple.      Right lower leg: No edema.      Left lower leg: No edema.   Skin:     General: Skin is warm and dry.      Coloration: Skin is not jaundiced.      Findings: No bruising, erythema, lesion or rash.   Neurological:      Mental Status: He is alert.      Cranial Nerves: No cranial nerve deficit.   Psychiatric:         Mood and Affect: Mood normal.         Behavior: Behavior normal.           Assessment:     1. Routine adult health maintenance    2. Insomnia, unspecified type    3. Prediabetes    4. Glaucoma, unspecified glaucoma type, unspecified laterality    5. Essential hypertension    6. Idiopathic gout, unspecified chronicity, unspecified site    7. Unspecified symptoms and signs involving cognitive functions and awareness    8. Need for shingles vaccine      Plan:     Maile Harris was seen today  for routine physical. Separate E/M Code for acute conditions discussed during today's routine physical examination have been documented in the assessment and plan below.      Diagnoses and all orders for this visit:    Routine adult health maintenance  Discussed healthy diet, regular exercise, necessary labs, age appropriate cancer screening, and routine vaccinations.      Insomnia, unspecified type  Longstanding/chronic  Trial trazodone  Discussed sleep hygiene, contributing factors  -     traZODone (DESYREL) 50 MG tablet; Take 1-2 tablets nightly    Prediabetes  Last A1c 6.2% - ressess A1c every 6-12 months  -     Vitamin B12; Future  -     Hemoglobin A1C; Future  -     Basic Metabolic Panel; Future    Glaucoma, unspecified glaucoma type, unspecified laterality  Following with Ophthalmology    Essential hypertension  BP controlled presently - reviewed anti-hypertensive regimen - continue current therapy    Idiopathic gout, unspecified chronicity, unspecified site  No recent flare-ups   On allopurinol  Repeat uric acid  -     Uric Acid; Future    Sinus bradycardia  Heart rate 48 presently typically ranging between 40-60    Need for shingles vaccine  Counseled regarding shingles vaccine - ordered  -     varicella-zoster GE-AS01B (PF)(SHINGRIX) 50 mcg/0.5 mL KIT      Health Maintenance reviewed, addressed as per orders          1. The patient indicates understanding of these issues and agrees with the plan. Brief care plan is updated and reviewed with the patient as applicable.   2. The patient is given an After Visit Summary that lists all medications with directions, allergies, orders placed during this encounter and follow-up instructions.   3. I have reviewed the patient's medical information including past medical, family, and social history sections including the medications and allergies.   4. We discussed the patient's current medications. I reconciled the patient's medication list and prepared and supplied  needed refills.       Lamberto Wolfe MD  Internal Medicine-Pediatrics

## 2024-06-07 NOTE — PROGRESS NOTES
Patient given shingrix  vaccine via injection.  0 complaints of, tolerated well.  VIS was given and patient advised to wait in lobby 15 minutes for side

## 2024-07-01 DIAGNOSIS — G47.00 INSOMNIA, UNSPECIFIED TYPE: ICD-10-CM

## 2024-07-01 RX ORDER — TRAZODONE HYDROCHLORIDE 50 MG/1
TABLET ORAL
Qty: 180 TABLET | Refills: 3 | Status: SHIPPED | OUTPATIENT
Start: 2024-07-01

## 2024-07-01 NOTE — TELEPHONE ENCOUNTER
Care Due:                  Date            Visit Type   Department     Provider  --------------------------------------------------------------------------------                                [facility]                              PRIMARY      MED/ INTERNAL  Last Visit: 06-      CARE ([facility])   MED/ PEDS      Lamberto Wolfe  Next Visit: None Scheduled  None         None Found                                                            Last  Test          Frequency    Reason                     Performed    Due Date  --------------------------------------------------------------------------------    CBC.........  12 months..  allopurinoL..............  07- 06-    [employer] Embedded Care Due Messages. Reference number: [id number].   7/01/2024 10:17:05 AM CDT

## 2024-07-01 NOTE — TELEPHONE ENCOUNTER
Refill Routing Note   Medication(s) are not appropriate for processing by [facility] for the following reason(s):        New or recently adjusted medication    [facility] action(s):  Defer     Requires labs : Yes             Appointments  past 12m or future 3m with PCP    Date Provider   Last Visit   6/7/2024 Lamberto Wolfe MD   Next Visit   12/17/2024 Lamberto Wolfe MD   ED visits in past 90 days: 0        Note composed:1:01 PM 07/01/2024

## 2024-08-07 ENCOUNTER — TELEPHONE (OUTPATIENT)
Dept: FAMILY MEDICINE | Facility: CLINIC | Age: 69
End: 2024-08-07
Payer: COMMERCIAL

## 2024-08-21 ENCOUNTER — CLINICAL SUPPORT (OUTPATIENT)
Dept: FAMILY MEDICINE | Facility: CLINIC | Age: 69
End: 2024-08-21
Payer: MEDICARE

## 2024-08-21 DIAGNOSIS — Z23 NEED FOR PROPHYLACTIC VACCINATION WITH UNSPECIFIED COMBINED VACCINE: Primary | ICD-10-CM

## 2024-08-21 PROCEDURE — 99999 PR PBB SHADOW E&M-EST. PATIENT-LVL I: CPT | Mod: PBBFAC,,,

## 2024-08-21 PROCEDURE — 90471 IMMUNIZATION ADMIN: CPT | Mod: PBBFAC,PO

## 2024-08-21 PROCEDURE — 99211 OFF/OP EST MAY X REQ PHY/QHP: CPT | Mod: PBBFAC,PO

## 2024-08-21 PROCEDURE — 90750 HZV VACC RECOMBINANT IM: CPT | Mod: PBBFAC,PO

## 2024-08-21 PROCEDURE — 99999PBSHW PR PBB SHADOW TECHNICAL ONLY FILED TO HB: Mod: PBBFAC,,,

## 2024-08-21 RX ADMIN — ZOSTER VACCINE RECOMBINANT, ADJUVANTED 0.5 ML: KIT at 08:08

## 2024-08-23 NOTE — PROGRESS NOTES
Review of patient's allergies indicates:   Allergen Reactions    Lisinopril Other (See Comments)     COUGH     Creatinine   Date Value Ref Range Status   06/03/2024 1.1 0.5 - 1.4 mg/dL Final     Sodium   Date Value Ref Range Status   06/03/2024 133 (L) 136 - 145 mmol/L Final     Potassium   Date Value Ref Range Status   06/03/2024 3.9 3.5 - 5.1 mmol/L Final       Current Outpatient Medications:     acetaZOLAMIDE (DIAMOX) 250 MG tablet, Take 250 mg by mouth 2 (two) times daily., Disp: , Rfl:     allopurinoL (ZYLOPRIM) 100 MG tablet, TAKE 1 TABLET BY MOUTH EVERY DAY, Disp: 90 tablet, Rfl: 0    COMBIGAN 0.2-0.5 % Drop, Place 1 drop into both eyes 2 (two) times daily., Disp: , Rfl:     dorzolamide (TRUSOPT) 2 % ophthalmic solution, Place into both eyes., Disp: , Rfl:     indapamide (LOZOL) 1.25 MG Tab, TAKE 1 TABLET(1.25 MG) BY MOUTH EVERY DAY, Disp: 90 tablet, Rfl: 3    latanoprost 0.005 % ophthalmic solution, Place 1 drop into both eyes every evening., Disp: , Rfl:     losartan (COZAAR) 25 MG tablet, Take 1 tablet (25 mg total) by mouth once daily., Disp: 90 tablet, Rfl: 3    pantoprazole (PROTONIX) 20 MG tablet, Take 1 tablet (20 mg total) by mouth every evening., Disp: 30 tablet, Rfl: 2    rosuvastatin (CRESTOR) 10 MG tablet, TAKE 1 TABLET(10 MG) BY MOUTH EVERY DAY, Disp: 90 tablet, Rfl: 3    tadalafiL (CIALIS) 20 MG Tab, Take as needed for erectile dysfunction, Disp: , Rfl:     traZODone (DESYREL) 50 MG tablet, TAKE 1 TO 2 TABLETS BY MOUTH EVERY NIGHT, Disp: 180 tablet, Rfl: 3

## 2024-10-23 ENCOUNTER — IMMUNIZATION (OUTPATIENT)
Dept: FAMILY MEDICINE | Facility: CLINIC | Age: 69
End: 2024-10-23
Payer: MEDICARE

## 2024-10-23 DIAGNOSIS — Z23 NEED FOR VACCINATION: Primary | ICD-10-CM

## 2024-10-23 PROCEDURE — 90653 IIV ADJUVANT VACCINE IM: CPT | Mod: PBBFAC,PO

## 2024-10-23 PROCEDURE — G0008 ADMIN INFLUENZA VIRUS VAC: HCPCS | Mod: PBBFAC,PO

## 2024-10-23 PROCEDURE — 99999PBSHW FLU VACCINE - ADJUVANTED: Mod: PBBFAC,,,

## 2024-11-05 NOTE — TELEPHONE ENCOUNTER
Care Due:                  Date            Visit Type   Department     Provider  --------------------------------------------------------------------------------                                [facility]                              PRIMARY      MED/ INTERNAL  Last Visit: 06-      CARE ([facility])   MED/ PEDS      Lamberto Wolfe                              [facility]      MED/ INTERNAL  Next Visit: 12-      CARE ([facility])   MED/ PEDS      Lamberto Wolfe                                                            Last  Test          Frequency    Reason                     Performed    Due Date  --------------------------------------------------------------------------------    CBC.........  12 months..  allopurinoL..............  07- 06-    CMP.........  12 months..  allopurinoL, rosuvastatin  11- 11-    Uric Acid...  12 months..  allopurinoL..............  02- 02-    [facility] Embedded Care Due Messages. Reference number: [id number].   11/04/2024 7:45:04 PM CST

## 2024-11-05 NOTE — TELEPHONE ENCOUNTER
Refill Routing Note   Medication(s) are not appropriate for processing by [facility] for the following reason(s):        Required labs outdated    [facility] action(s):  Defer     Requires labs : Yes             Appointments  past 12m or future 3m with PCP    Date Provider   Last Visit   6/7/2024 Lamberto Wolfe MD   Next Visit   12/17/2024 Lamberto Wolfe MD   ED visits in past 90 days: 0        Note composed:4:35 AM 11/05/2024

## 2024-11-06 RX ORDER — ALLOPURINOL 100 MG/1
TABLET ORAL
Qty: 90 TABLET | Refills: 0 | Status: SHIPPED | OUTPATIENT
Start: 2024-11-06

## 2024-11-07 ENCOUNTER — CLINICAL SUPPORT (OUTPATIENT)
Dept: FAMILY MEDICINE | Facility: CLINIC | Age: 69
End: 2024-11-07
Payer: MEDICARE

## 2024-11-07 DIAGNOSIS — Z23 NEED FOR COVID-19 VACCINE: Primary | ICD-10-CM

## 2024-11-07 PROCEDURE — 91320 SARSCV2 VAC 30MCG TRS-SUC IM: CPT | Mod: PBBFAC,PO

## 2024-11-07 PROCEDURE — 99999 PR PBB SHADOW E&M-EST. PATIENT-LVL I: CPT | Mod: PBBFAC,,,

## 2024-11-07 PROCEDURE — 99999PBSHW PR PBB SHADOW TECHNICAL ONLY FILED TO HB: Mod: PBBFAC,,,

## 2024-11-07 PROCEDURE — 90480 ADMN SARSCOV2 VAC 1/ONLY CMP: CPT | Mod: PBBFAC,PO

## 2024-11-07 RX ADMIN — COVID-19 VACCINE, MRNA 0.3 ML: 0.04 INJECTION, SUSPENSION INTRAMUSCULAR at 08:11

## 2024-11-22 DIAGNOSIS — Z12.5 ENCOUNTER FOR PROSTATE CANCER SCREENING: Primary | ICD-10-CM

## 2024-11-22 DIAGNOSIS — I10 ESSENTIAL HYPERTENSION: ICD-10-CM

## 2024-11-22 RX ORDER — LOSARTAN POTASSIUM 25 MG/1
25 TABLET ORAL
Qty: 90 TABLET | Refills: 1 | Status: SHIPPED | OUTPATIENT
Start: 2024-11-22

## 2024-11-22 NOTE — TELEPHONE ENCOUNTER
No care due was identified.  [facility] Embedded Care Due Messages. Reference number: [id number].   11/22/2024 3:50:58 AM CST

## 2024-11-22 NOTE — TELEPHONE ENCOUNTER
Refill Decision Note   Maile Harris  is requesting a refill authorization.  Brief Assessment and Rationale for Refill:  Approve     Medication Therapy Plan:         Comments:     Note composed:8:38 AM 11/22/2024

## 2024-11-27 ENCOUNTER — TELEPHONE (OUTPATIENT)
Dept: FAMILY MEDICINE | Facility: CLINIC | Age: 69
End: 2024-11-27
Payer: COMMERCIAL

## 2024-11-27 ENCOUNTER — TELEPHONE (OUTPATIENT)
Dept: FAMILY MEDICINE | Facility: CLINIC | Age: 69
End: 2024-11-27

## 2024-11-27 NOTE — TELEPHONE ENCOUNTER
"----- Message from Jeffry sent at 11/26/2024 12:21 PM CST -----  Regarding: Jyoti "wife"                                                          Reschedule Appointment     Patient Name: Maile Harris       Original Date Of Appt: 12/17      Preferred Date: 12/19 please call the patient for scheduling       Contact Info: .[phone]        Additional Info:  "

## 2024-11-27 NOTE — TELEPHONE ENCOUNTER
"----- Message from Jeffry sent at 11/26/2024 12:21 PM CST -----  Regarding: Jyoti "wife"                                                          Reschedule Appointment     Patient Name: Maile Harris       Original Date Of Appt: 12/17      Preferred Date: 12/19 please call the patient for scheduling       Contact Info: [phone]        Additional Info:  "

## 2024-12-10 ENCOUNTER — LAB VISIT (OUTPATIENT)
Dept: LAB | Facility: HOSPITAL | Age: 69
End: 2024-12-10
Attending: INTERNAL MEDICINE
Payer: MEDICARE

## 2024-12-10 DIAGNOSIS — R73.03 PREDIABETES: ICD-10-CM

## 2024-12-10 DIAGNOSIS — R41.9 UNSPECIFIED SYMPTOMS AND SIGNS INVOLVING COGNITIVE FUNCTIONS AND AWARENESS: ICD-10-CM

## 2024-12-10 DIAGNOSIS — M10.00 IDIOPATHIC GOUT, UNSPECIFIED CHRONICITY, UNSPECIFIED SITE: ICD-10-CM

## 2024-12-10 DIAGNOSIS — Z12.5 ENCOUNTER FOR PROSTATE CANCER SCREENING: ICD-10-CM

## 2024-12-10 LAB
ANION GAP SERPL CALC-SCNC: 10 MMOL/L (ref 8–16)
BUN SERPL-MCNC: 16 MG/DL (ref 8–23)
CALCIUM SERPL-MCNC: 8.9 MG/DL (ref 8.7–10.5)
CHLORIDE SERPL-SCNC: 105 MMOL/L (ref 95–110)
CO2 SERPL-SCNC: 20 MMOL/L (ref 23–29)
COMPLEXED PSA SERPL-MCNC: 2.6 NG/ML (ref 0–4)
CREAT SERPL-MCNC: 1.2 MG/DL (ref 0.5–1.4)
EST. GFR  (NO RACE VARIABLE): >60 ML/MIN/1.73 M^2
ESTIMATED AVG GLUCOSE: 131 MG/DL (ref 68–131)
GLUCOSE SERPL-MCNC: 112 MG/DL (ref 70–110)
HBA1C MFR BLD: 6.2 % (ref 4–5.6)
POTASSIUM SERPL-SCNC: 3.2 MMOL/L (ref 3.5–5.1)
SODIUM SERPL-SCNC: 135 MMOL/L (ref 136–145)
URATE SERPL-MCNC: 6.3 MG/DL (ref 3.4–7)
VIT B12 SERPL-MCNC: 713 PG/ML (ref 210–950)

## 2024-12-10 PROCEDURE — 82607 VITAMIN B-12: CPT | Performed by: INTERNAL MEDICINE

## 2024-12-10 PROCEDURE — 80048 BASIC METABOLIC PNL TOTAL CA: CPT | Performed by: INTERNAL MEDICINE

## 2024-12-10 PROCEDURE — 83036 HEMOGLOBIN GLYCOSYLATED A1C: CPT | Performed by: INTERNAL MEDICINE

## 2024-12-10 PROCEDURE — 84550 ASSAY OF BLOOD/URIC ACID: CPT | Performed by: INTERNAL MEDICINE

## 2024-12-10 PROCEDURE — 84153 ASSAY OF PSA TOTAL: CPT | Performed by: INTERNAL MEDICINE

## 2024-12-12 DIAGNOSIS — E87.6 HYPOKALEMIA: Primary | ICD-10-CM

## 2024-12-13 NOTE — PROGRESS NOTES
Sent [facility] message discussing results. Plan as noted:    1) schedule potassium level repeat before upcoming visit on 12/19

## 2024-12-17 ENCOUNTER — PATIENT MESSAGE (OUTPATIENT)
Dept: FAMILY MEDICINE | Facility: CLINIC | Age: 69
End: 2024-12-17
Payer: COMMERCIAL

## 2024-12-19 ENCOUNTER — OFFICE VISIT (OUTPATIENT)
Dept: FAMILY MEDICINE | Facility: CLINIC | Age: 69
End: 2024-12-19
Payer: MEDICARE

## 2024-12-19 ENCOUNTER — LAB VISIT (OUTPATIENT)
Dept: LAB | Facility: HOSPITAL | Age: 69
End: 2024-12-19
Attending: INTERNAL MEDICINE
Payer: MEDICARE

## 2024-12-19 VITALS
OXYGEN SATURATION: 98 % | TEMPERATURE: 98 F | DIASTOLIC BLOOD PRESSURE: 60 MMHG | HEIGHT: 71 IN | BODY MASS INDEX: 30.31 KG/M2 | SYSTOLIC BLOOD PRESSURE: 128 MMHG | WEIGHT: 216.5 LBS | HEART RATE: 64 BPM

## 2024-12-19 DIAGNOSIS — E87.6 HYPOKALEMIA: ICD-10-CM

## 2024-12-19 DIAGNOSIS — G47.00 INSOMNIA, UNSPECIFIED TYPE: ICD-10-CM

## 2024-12-19 DIAGNOSIS — H40.9 GLAUCOMA, UNSPECIFIED GLAUCOMA TYPE, UNSPECIFIED LATERALITY: ICD-10-CM

## 2024-12-19 DIAGNOSIS — R73.03 PREDIABETES: ICD-10-CM

## 2024-12-19 DIAGNOSIS — E55.9 VITAMIN D DEFICIENCY: ICD-10-CM

## 2024-12-19 DIAGNOSIS — Z00.00 ROUTINE ADULT HEALTH MAINTENANCE: Primary | ICD-10-CM

## 2024-12-19 DIAGNOSIS — I10 ESSENTIAL HYPERTENSION: ICD-10-CM

## 2024-12-19 LAB — POTASSIUM SERPL-SCNC: 3.3 MMOL/L (ref 3.5–5.1)

## 2024-12-19 PROCEDURE — 99999 PR PBB SHADOW E&M-EST. PATIENT-LVL V: CPT | Mod: PBBFAC,,,

## 2024-12-19 PROCEDURE — 82652 VIT D 1 25-DIHYDROXY: CPT

## 2024-12-19 PROCEDURE — 99215 OFFICE O/P EST HI 40 MIN: CPT | Mod: PBBFAC,PO

## 2024-12-19 PROCEDURE — 99214 OFFICE O/P EST MOD 30 MIN: CPT | Mod: S$PBB,,,

## 2024-12-19 PROCEDURE — G2211 COMPLEX E/M VISIT ADD ON: HCPCS | Mod: S$PBB,,,

## 2024-12-19 PROCEDURE — 84132 ASSAY OF SERUM POTASSIUM: CPT | Performed by: INTERNAL MEDICINE

## 2024-12-19 NOTE — PROGRESS NOTES
HPI     Maile Harris is a 69 y.o. male with multiple medical diagnoses as listed in the medical history and problem list that presents for follow up on lab work and health maintenance. PCP Dr. Wolfe with last visit in this clinic on 6/7/24.     Chief Complaint   Patient presents with    Follow-up       HPI    Patient here for follow up on chronic health issues and lab work. Feeling well today, here with spouse. Patient has a history significant for glaucoma, hypertension, prediabetes, gout, and insomnia.   Potassium level low (3.2) on most recent labs; will have repeat blood work drawn today to reassess.     Glaucoma: followed by Ophthalmology. Patient reports he is stable and doing well.     Gout: No recent flare ups. Still taking allopurinol daily.     Hypertension: stable, well controlled.     Insomnia: Reports history of chronic insomnia going on for many years. Only sleeps about 3-4 hours a night, then cannot fall back asleep. Does nod off during the day. Has tried OTC melatonin and Trazodone with minimal relief. Spouse takes alprazolam PRN for sleep, and feels like it helps a lot with minimal drowsiness the next morning. She is wondering if this might be a good option for the patient.     Assessment & Plan     1. Routine adult health maintenance    Recent lab work discussed. Has repeat potassium lab draw today due to recent potassium of 3.2. Has been working on healthy diet and staying active. Discussed decreasing alcohol intake gradually to help with prediabetes. Discussed preventative health care measures.     2. Prediabetes    HgbA1c was 6.2 on most recent lab work. Discussed healthy food and drink choices, decreasing alcohol, exercising. Will recheck at follow up visit with PCP.     3. Essential hypertension    Stable on Losartan and Lozol daily. The current medical regimen is effective;  continue present plan and medications.     BP Readings from Last 3 Encounters:   12/19/24 128/60   06/07/24  104/60   12/04/23 136/62     -continue current medication regimen  -DASH diet, regular cardiovascular exercises, portion control  -weight loss    4. Insomnia, unspecified type    Discussed pros and cons of benzodiazepine use for insomnia, especially after age 65. Has history of chronic insomnia with daytime sleepiness. No recent sleep study found on chart. Encouraged sleep hygiene measures and continued use of Trazodone for now. Will refer to Sleep Medicine and Sleep 101 course. Follow up with clinic for any worsening symptoms, or if symptoms fail to improve.       - Ambulatory referral/consult to Sleep Disorders; Future    5. Glaucoma, unspecified glaucoma type, unspecified laterality    Stable, followed by Ophthalmology. The current medical regimen is effective;  continue present plan and medications.    6. Vitamin D deficiency    History of Vitamin D deficiency, would like blood levels checked. Will obtain blood work today.     - Calcitriol; Future      Discussed DDx, condition, and treatment.   Education sent to patient portal/included in after visit summary.  ED precautions given.   Notify provider if symptoms do not resolve or increase in severity.   Patient verbalizes understanding and agrees with plan of care.  --------------------------------------------      Health Maintenance:  Health Maintenance         Date Due Completion Date    TETANUS VACCINE Never done ---    RSV Vaccine (Age 60+ and Pregnant patients) (1 - Risk 60-74 years 1-dose series) Never done ---    PROSTATE-SPECIFIC ANTIGEN 12/10/2025 12/10/2024    Hemoglobin A1c (Prediabetes) 12/10/2025 12/10/2024    Colorectal Cancer Screening 10/27/2027 10/27/2022    Lipid Panel 06/03/2029 6/3/2024            Discussed the importance of overdue vaccines which were offered during this encounter. Patient declined overdue vaccines at this time and Advised patient on the importance of completing overdue health maintenance items    Follow Up:  Follow up in  "about 6 months (around 6/19/2025).    Exam     Review of Systems:  (as noted above)  Review of Systems   Constitutional:  Negative for activity change, appetite change and fever.   HENT:  Negative for trouble swallowing.    Respiratory:  Negative for shortness of breath.    Cardiovascular:  Negative for chest pain.   Gastrointestinal:  Negative for blood in stool.   Psychiatric/Behavioral:  Positive for sleep disturbance.        Physical Exam:   Physical Exam  Constitutional:       Appearance: Normal appearance.   HENT:      Head: Normocephalic and atraumatic.   Cardiovascular:      Rate and Rhythm: Normal rate and regular rhythm.      Pulses: Normal pulses.      Heart sounds: Normal heart sounds. No murmur heard.  Pulmonary:      Effort: Pulmonary effort is normal. No respiratory distress.      Breath sounds: Normal breath sounds. No wheezing.   Skin:     General: Skin is warm and dry.      Capillary Refill: Capillary refill takes less than 2 seconds.   Neurological:      Mental Status: He is alert and oriented to person, place, and time.   Psychiatric:         Mood and Affect: Mood normal.         Behavior: Behavior normal.       Vitals:    12/19/24 1008   BP: 128/60   BP Location: Right arm   Patient Position: Sitting   Pulse: 64   Temp: 98 °F (36.7 °C)   TempSrc: Oral   SpO2: 98%   Weight: 98.2 kg (216 lb 7.9 oz)   Height: 5' 11" (1.803 m)      Body mass index is 30.19 kg/m².        History     Past Medical History:  Past Medical History:   Diagnosis Date    Glaucoma     Hypertension        Past Surgical History:  History reviewed. No pertinent surgical history.    Social History:  Social History     Socioeconomic History    Marital status:    Tobacco Use    Smoking status: Former    Smokeless tobacco: Never   Substance and Sexual Activity    Alcohol use: Yes     Alcohol/week: 3.0 standard drinks of alcohol     Types: 3 Cans of beer per week     Comment: 3 BEERS DAILY    Drug use: Never     Social Drivers " of Health     Financial Resource Strain: Low Risk  (6/2/2024)    Overall Financial Resource Strain (CARDIA)     Difficulty of Paying Living Expenses: Not hard at all   Food Insecurity: No Food Insecurity (6/2/2024)    Hunger Vital Sign     Worried About Running Out of Food in the Last Year: Never true     Ran Out of Food in the Last Year: Never true   Transportation Needs: No Transportation Needs (12/3/2023)    [facility]    PRAPARE - Transportation     Lack of Transportation (Medical): No     Lack of Transportation (Non-Medical): No   Physical Activity: Sufficiently Active (6/2/2024)    Exercise Vital Sign     Days of Exercise per Week: 5 days     Minutes of Exercise per Session: 60 min   Stress: Stress Concern Present (6/2/2024)    [employer] - Occupational Stress Questionnaire     Feeling of Stress : To some extent   Housing Stability: Unknown (6/2/2024)    Housing Stability Vital Sign     Unable to Pay for Housing in the Last Year: No       Family History:  No family history on file.    Allergies and Medications: (updated and reviewed)  Review of patient's allergies indicates:   Allergen Reactions    Lisinopril Other (See Comments)     COUGH     Current Outpatient Medications   Medication Sig Dispense Refill    acetaZOLAMIDE (DIAMOX) 250 MG tablet Take 250 mg by mouth 2 (two) times daily.      allopurinoL (ZYLOPRIM) 100 MG tablet TAKE 1 TABLET BY MOUTH EVERY DAY 90 tablet 0    COMBIGAN 0.2-0.5 % Drop Place 1 drop into both eyes 2 (two) times daily.      dorzolamide (TRUSOPT) 2 % ophthalmic solution Place into both eyes.      indapamide (LOZOL) 1.25 MG Tab TAKE 1 TABLET(1.25 MG) BY MOUTH EVERY DAY 90 tablet 3    latanoprost 0.005 % ophthalmic solution Place 1 drop into both eyes every evening.      losartan (COZAAR) 25 MG tablet TAKE 1 TABLET(25 MG) BY MOUTH EVERY DAY 90 tablet 1    rosuvastatin (CRESTOR) 10 MG tablet TAKE 1 TABLET(10 MG) BY MOUTH EVERY  DAY 90 tablet 3    tadalafiL (CIALIS) 20 MG Tab Take as needed for erectile dysfunction      pantoprazole (PROTONIX) 20 MG tablet Take 1 tablet (20 mg total) by mouth every evening. 30 tablet 2    potassium chloride SA (K-DUR,KLOR-CON) 20 MEQ tablet Take 1 tablet (20 mEq total) by mouth 2 (two) times daily. 180 tablet 3     No current facility-administered medications for this visit.       Patient Care Team:  Lamberto Wolfe MD as PCP - General (Internal Medicine)  [facility], [facility] (Gastroenterology)         - The patient is given an After Visit Summary that lists all medications with directions, allergies, education, orders placed during this encounter and follow-up instructions.      - I have reviewed the patient's medical information including past medical, family, and social history sections including the medications and allergies.      - We discussed the patient's current medications.     This note was created by combination of typed  and MModal dictation.  Transcription errors may be present.  If there are any questions, please contact me.                 ELLA Saunders

## 2024-12-19 NOTE — Clinical Note
Sj Wolfe,  I saw him on Thursday and he and his wife were asking about different meds for insomnia. He's been taking Trazodone 100mg nightly, but still only getting 3-4 hours of rest. His wife was asking about Xanax nightly, but I was hesitant based on his age...what do you think about xanax vs. Low dose ambien for him? Was also thinking perhaps a sleep study and maybe sending him to the Sleep 101 course that Psych does. Thanks in advance for any recs or advice! -Bettie

## 2024-12-21 ENCOUNTER — PATIENT MESSAGE (OUTPATIENT)
Dept: FAMILY MEDICINE | Facility: CLINIC | Age: 69
End: 2024-12-21
Payer: COMMERCIAL

## 2024-12-21 DIAGNOSIS — E87.6 HYPOKALEMIA: Primary | ICD-10-CM

## 2024-12-21 RX ORDER — POTASSIUM CHLORIDE 20 MEQ/1
20 TABLET, EXTENDED RELEASE ORAL 2 TIMES DAILY
Qty: 180 TABLET | Refills: 3 | Status: SHIPPED | OUTPATIENT
Start: 2024-12-21

## 2024-12-23 LAB — 1,25(OH)2D3 SERPL-MCNC: 40 PG/ML (ref 20–79)

## 2024-12-24 ENCOUNTER — TELEPHONE (OUTPATIENT)
Dept: FAMILY MEDICINE | Facility: CLINIC | Age: 69
End: 2024-12-24
Payer: COMMERCIAL

## 2024-12-24 NOTE — TELEPHONE ENCOUNTER
----- Message from Lamberto Wolfe MD sent at 12/21/2024 10:37 AM CST -----  Please call the patient regarding the following results:    Patient's potassium is still low -- starting potassium supplements daily as prescribed. Sent medication (s) to patient's preferred pharmacy on file.    Please schedule repeat potassium in 1 month.      Lamberto Wolfe MD  Internal Medicine-Pediatrics

## 2024-12-24 NOTE — TELEPHONE ENCOUNTER
Patient notified of following test results with recommendation. Patient started medication today and schedule lab for 1/24/25.

## 2024-12-31 ENCOUNTER — TELEPHONE (OUTPATIENT)
Dept: FAMILY MEDICINE | Facility: CLINIC | Age: 69
End: 2024-12-31
Payer: COMMERCIAL

## 2024-12-31 DIAGNOSIS — G47.00 INSOMNIA, UNSPECIFIED TYPE: Primary | ICD-10-CM

## 2025-01-07 DIAGNOSIS — E78.5 DYSLIPIDEMIA: ICD-10-CM

## 2025-01-07 RX ORDER — ROSUVASTATIN CALCIUM 10 MG/1
10 TABLET, COATED ORAL
Qty: 90 TABLET | Refills: 0 | Status: SHIPPED | OUTPATIENT
Start: 2025-01-07

## 2025-01-07 NOTE — TELEPHONE ENCOUNTER
Refill Routing Note   Medication(s) are not appropriate for processing by [facility] for the following reason(s):        Required labs outdated    [facility] action(s):  Defer     Requires labs : Yes             Appointments  past 12m or future 3m with PCP    Date Provider   Last Visit   6/7/2024 Lamberto Wolfe MD   Next Visit   6/5/2025 Lamberto Wolfe MD   ED visits in past 90 days: 0        Note composed:12:46 PM 01/07/2025

## 2025-01-07 NOTE — TELEPHONE ENCOUNTER
Care Due:                  Date            Visit Type   Department     Provider  --------------------------------------------------------------------------------                                [facility]                              PRIMARY      MED/ INTERNAL  Last Visit: 06-      CARE ([facility])   MED/ PEDS      Lambreto Wolfe                              [facility]                              PRIMARY      MED/ INTERNAL  Next Visit: 06-      CARE ([facility])   MED/ PEDS      Lamberto Wolfe                                                            Last  Test          Frequency    Reason                     Performed    Due Date  --------------------------------------------------------------------------------    CBC.........  12 months..  allopurinoL..............  07- 06-    CMP.........  12 months..  allopurinoL, rosuvastatin  11- 11-    [facility] Embedded Care Due Messages. Reference number: [id number].   1/07/2025 5:49:40 AM CST

## 2025-01-27 ENCOUNTER — LAB VISIT (OUTPATIENT)
Dept: LAB | Facility: HOSPITAL | Age: 70
End: 2025-01-27
Attending: INTERNAL MEDICINE
Payer: MEDICARE

## 2025-01-27 DIAGNOSIS — E87.6 HYPOKALEMIA: ICD-10-CM

## 2025-01-27 LAB — POTASSIUM SERPL-SCNC: 3.9 MMOL/L (ref 3.5–5.1)

## 2025-01-27 PROCEDURE — 84132 ASSAY OF SERUM POTASSIUM: CPT | Performed by: INTERNAL MEDICINE

## 2025-01-27 PROCEDURE — 36415 COLL VENOUS BLD VENIPUNCTURE: CPT | Mod: PO | Performed by: INTERNAL MEDICINE

## 2025-02-03 DIAGNOSIS — I10 ESSENTIAL HYPERTENSION: ICD-10-CM

## 2025-02-03 NOTE — TELEPHONE ENCOUNTER
No care due was identified.  Health [facility] Embedded Care Due Messages. Reference number: [id number].   2/03/2025 3:44:24 PM CST

## 2025-02-04 RX ORDER — INDAPAMIDE 1.25 MG/1
1.25 TABLET ORAL DAILY
Qty: 90 TABLET | Refills: 3 | Status: SHIPPED | OUTPATIENT
Start: 2025-02-04

## 2025-02-11 NOTE — TELEPHONE ENCOUNTER
No care due was identified.  Health [facility] Embedded Care Due Messages. Reference number: [id number].   2/11/2025 3:58:42 PM CST

## 2025-02-11 NOTE — TELEPHONE ENCOUNTER
Refill Routing Note   Medication(s) are not appropriate for processing by [facility] for the following reason(s):        Required labs outdated    [facility] action(s):  Defer             Appointments  past 12m or future 3m with PCP    Date Provider   Last Visit   6/7/2024 Lamberto Wolfe MD   Next Visit   6/5/2025 Lamberto Wolfe MD   ED visits in past 90 days: 0        Note composed:5:48 PM 02/11/2025

## 2025-02-12 RX ORDER — ALLOPURINOL 100 MG/1
TABLET ORAL
Qty: 90 TABLET | Refills: 0 | Status: SHIPPED | OUTPATIENT
Start: 2025-02-12

## 2025-02-22 DIAGNOSIS — Z00.00 ENCOUNTER FOR MEDICARE ANNUAL WELLNESS EXAM: ICD-10-CM

## 2025-04-04 DIAGNOSIS — E78.5 DYSLIPIDEMIA: ICD-10-CM

## 2025-04-04 RX ORDER — ROSUVASTATIN CALCIUM 10 MG/1
10 TABLET, COATED ORAL
Qty: 90 TABLET | Refills: 0 | Status: SHIPPED | OUTPATIENT
Start: 2025-04-04

## 2025-04-04 NOTE — TELEPHONE ENCOUNTER
Care Due:                  Date            Visit Type   Department     Provider  --------------------------------------------------------------------------------                                [facility]                              PRIMARY      / INTERNAL MED  Last Visit: 06-      CARE ([facility])   / YUNIOR Wolfe                              [facility]                              PRIMARY      / INTERNAL MED  Next Visit: 06-      CARE ([facility])   / YUNIOR Wolfe                                                            Last  Test          Frequency    Reason                     Performed    Due Date  --------------------------------------------------------------------------------    CBC.........  12 months..  allopurinoL..............  07- 06-    CMP.........  12 months..  allopurinoL, rosuvastatin  11- 11-    Lipid Panel.  12 months..  rosuvastatin.............  06- 05-    Health [facility] Embedded Care Due Messages. Reference number: [id number].   4/04/2025 5:49:39 AM CDT

## 2025-04-04 NOTE — TELEPHONE ENCOUNTER
Refill Routing Note   Medication(s) are not appropriate for processing by [facility] for the following reason(s):        Required labs outdated    [facility] action(s):  Defer     Requires labs : Yes             Appointments  past 12m or future 3m with PCP    Date Provider   Last Visit   6/7/2024 Lamberto Wolfe MD   Next Visit   6/5/2025 Lamberto Wolfe MD   ED visits in past 90 days: 0        Note composed:11:33 AM 04/04/2025

## 2025-04-06 RX ORDER — ALLOPURINOL 100 MG/1
100 TABLET ORAL
Qty: 90 TABLET | Refills: 0 | Status: SHIPPED | OUTPATIENT
Start: 2025-04-06

## 2025-04-06 NOTE — TELEPHONE ENCOUNTER
Refill Routing Note   Medication(s) are not appropriate for processing by [facility] for the following reason(s):        Required labs outdated    [facility] action(s):  Defer               Appointments  past 12m or future 3m with PCP    Date Provider   Last Visit   6/7/2024 Lamberto Wolfe MD   Next Visit   6/5/2025 Lamberto Wolfe MD   ED visits in past 90 days: 0        Note composed:2:49 PM 04/06/2025

## 2025-04-06 NOTE — TELEPHONE ENCOUNTER
No care due was identified.  Health [facility] Embedded Care Due Messages. Reference number: [id number].   4/06/2025 5:51:14 AM CDT

## 2025-05-27 ENCOUNTER — TELEPHONE (OUTPATIENT)
Dept: FAMILY MEDICINE | Facility: CLINIC | Age: 70
End: 2025-05-27
Payer: MEDICARE

## 2025-05-27 NOTE — TELEPHONE ENCOUNTER
----- Message from Kathycitlalli sent at 5/27/2025 11:18 AM CDT -----  Type: Patient CallWho Called: Paresh Pt's wifeDoes the patient know what this is regarding? Requesting a call back to discuss having orders placed for lab work prior to his visit on 6/5. Please advise Does the patient rather a call back or a response via [facility]? callBest Call Back Number: [phone] Information:

## 2025-05-30 ENCOUNTER — TELEPHONE (OUTPATIENT)
Dept: FAMILY MEDICINE | Facility: CLINIC | Age: 70
End: 2025-05-30
Payer: MEDICARE

## 2025-05-30 DIAGNOSIS — Z12.5 PROSTATE CANCER SCREENING: ICD-10-CM

## 2025-05-30 DIAGNOSIS — Z00.00 ROUTINE ADULT HEALTH MAINTENANCE: ICD-10-CM

## 2025-05-30 DIAGNOSIS — R73.03 PREDIABETES: Primary | ICD-10-CM

## 2025-05-30 NOTE — TELEPHONE ENCOUNTER
Copied from CRM #[id number]. Topic: Appointments - Amb Referral  >> May 29, 2025  3:42 PM Sally wrote:  .Type: Lab    Caller is requesting to schedule their Lab appointment prior to annual appointment.    Order is not listed in EPIC.  Please enter order and contact patient to schedule.    Name of Caller: Jyoti- wife     Preferred Date and Time of Labs: next week    Date of EPP Appointment:June 5 th     Where would they like the lab performed?[facility]     Would the patient rather a call back or a response via [facility]? [phone]    Best Call Back Number: [phone]      Additional Information:

## 2025-05-30 NOTE — TELEPHONE ENCOUNTER
Call was placed to patient in regard to lab request prior to office visit 6-5-25. Patient was notified of need to reschedule that appointment due to provider not in clinic/unavailable on that date. Spoke with spouse, Jyoti and rescheduled to 8-6-25. Confirmation and thank you verbalized. Assured patient that request for labs prior would be sent to provider.

## 2025-07-28 ENCOUNTER — LAB VISIT (OUTPATIENT)
Dept: LAB | Facility: HOSPITAL | Age: 70
End: 2025-07-28
Payer: MEDICARE

## 2025-07-28 DIAGNOSIS — Z00.00 ROUTINE ADULT HEALTH MAINTENANCE: ICD-10-CM

## 2025-07-28 DIAGNOSIS — Z12.5 PROSTATE CANCER SCREENING: ICD-10-CM

## 2025-07-28 LAB
ABSOLUTE EOSINOPHIL (OHS): 0.12 K/UL
ABSOLUTE MONOCYTE (OHS): 0.52 K/UL (ref 0.3–1)
ABSOLUTE NEUTROPHIL COUNT (OHS): 2.96 K/UL (ref 1.8–7.7)
ANION GAP (OHS): 9 MMOL/L (ref 8–16)
BASOPHILS # BLD AUTO: 0.03 K/UL
BASOPHILS NFR BLD AUTO: 0.5 %
BUN SERPL-MCNC: 13 MG/DL (ref 8–23)
CALCIUM SERPL-MCNC: 9.7 MG/DL (ref 8.7–10.5)
CHLORIDE SERPL-SCNC: 100 MMOL/L (ref 95–110)
CHOLEST SERPL-MCNC: 156 MG/DL (ref 120–199)
CHOLEST/HDLC SERPL: 2.4 {RATIO} (ref 2–5)
CO2 SERPL-SCNC: 22 MMOL/L (ref 23–29)
CREAT SERPL-MCNC: 1.3 MG/DL (ref 0.5–1.4)
EAG (OHS): 128 MG/DL (ref 68–131)
ERYTHROCYTE [DISTWIDTH] IN BLOOD BY AUTOMATED COUNT: 13.5 % (ref 11.5–14.5)
GFR SERPLBLD CREATININE-BSD FMLA CKD-EPI: 59 ML/MIN/1.73/M2
GLUCOSE SERPL-MCNC: 107 MG/DL (ref 70–110)
HBA1C MFR BLD: 6.1 % (ref 4–5.6)
HCT VFR BLD AUTO: 42.2 % (ref 40–54)
HDLC SERPL-MCNC: 65 MG/DL (ref 40–75)
HDLC SERPL: 41.7 % (ref 20–50)
HGB BLD-MCNC: 14 GM/DL (ref 14–18)
IMM GRANULOCYTES # BLD AUTO: 0.01 K/UL (ref 0–0.04)
IMM GRANULOCYTES NFR BLD AUTO: 0.2 % (ref 0–0.5)
LDLC SERPL CALC-MCNC: 77.6 MG/DL (ref 63–159)
LYMPHOCYTES # BLD AUTO: 1.85 K/UL (ref 1–4.8)
MCH RBC QN AUTO: 30.1 PG (ref 27–31)
MCHC RBC AUTO-ENTMCNC: 33.2 G/DL (ref 32–36)
MCV RBC AUTO: 91 FL (ref 82–98)
NONHDLC SERPL-MCNC: 91 MG/DL
NUCLEATED RBC (/100WBC) (OHS): 0 /100 WBC
PLATELET # BLD AUTO: 264 K/UL (ref 150–450)
PMV BLD AUTO: 9.5 FL (ref 9.2–12.9)
POTASSIUM SERPL-SCNC: 4 MMOL/L (ref 3.5–5.1)
PSA SERPL-MCNC: 3.21 NG/ML
RBC # BLD AUTO: 4.65 M/UL (ref 4.6–6.2)
RELATIVE EOSINOPHIL (OHS): 2.2 %
RELATIVE LYMPHOCYTE (OHS): 33.7 % (ref 18–48)
RELATIVE MONOCYTE (OHS): 9.5 % (ref 4–15)
RELATIVE NEUTROPHIL (OHS): 53.9 % (ref 38–73)
SODIUM SERPL-SCNC: 131 MMOL/L (ref 136–145)
TRIGL SERPL-MCNC: 67 MG/DL (ref 30–150)
WBC # BLD AUTO: 5.49 K/UL (ref 3.9–12.7)

## 2025-07-28 PROCEDURE — 85025 COMPLETE CBC W/AUTO DIFF WBC: CPT

## 2025-07-28 PROCEDURE — 83718 ASSAY OF LIPOPROTEIN: CPT

## 2025-07-28 PROCEDURE — 36415 COLL VENOUS BLD VENIPUNCTURE: CPT | Mod: PO

## 2025-07-28 PROCEDURE — 83036 HEMOGLOBIN GLYCOSYLATED A1C: CPT

## 2025-07-28 PROCEDURE — 82435 ASSAY OF BLOOD CHLORIDE: CPT

## 2025-07-28 PROCEDURE — 84153 ASSAY OF PSA TOTAL: CPT

## 2025-08-03 NOTE — TELEPHONE ENCOUNTER
No care due was identified.  [facility] Embedded Care Due Messages. Reference number: [id number].   8/02/2025 7:37:03 PM CDT

## 2025-08-04 RX ORDER — ALLOPURINOL 100 MG/1
100 TABLET ORAL
Qty: 90 TABLET | Refills: 0 | Status: SHIPPED | OUTPATIENT
Start: 2025-08-04

## 2025-08-04 NOTE — TELEPHONE ENCOUNTER
Refill Routing Note   Medication(s) are not appropriate for processing by [facility] for the following reason(s):        Required labs outdated    [facility] action(s):  Defer               Appointments  past 12m or future 3m with PCP    Date Provider   Last Visit   6/7/2024 Lamberto Wolfe MD   Next Visit   8/6/2025 Lamberto Wolfe MD   ED visits in past 90 days: 0        Note composed:7:38 AM 08/04/2025

## 2025-08-06 ENCOUNTER — OFFICE VISIT (OUTPATIENT)
Dept: FAMILY MEDICINE | Facility: CLINIC | Age: 70
End: 2025-08-06
Payer: MEDICARE

## 2025-08-06 VITALS
HEIGHT: 71 IN | TEMPERATURE: 98 F | BODY MASS INDEX: 29.2 KG/M2 | OXYGEN SATURATION: 99 % | WEIGHT: 208.56 LBS | HEART RATE: 60 BPM | SYSTOLIC BLOOD PRESSURE: 136 MMHG | DIASTOLIC BLOOD PRESSURE: 66 MMHG

## 2025-08-06 DIAGNOSIS — R09.89 CHRONIC SINUS COMPLAINTS: ICD-10-CM

## 2025-08-06 DIAGNOSIS — G25.3 SLEEP MYOCLONUS: ICD-10-CM

## 2025-08-06 DIAGNOSIS — Z81.8 FAMILY HISTORY OF DEMENTIA: ICD-10-CM

## 2025-08-06 DIAGNOSIS — E87.1 HYPONATREMIA: ICD-10-CM

## 2025-08-06 DIAGNOSIS — F51.12 INSUFFICIENT SLEEP SYNDROME: ICD-10-CM

## 2025-08-06 DIAGNOSIS — G47.00 INSOMNIA, UNSPECIFIED TYPE: Primary | ICD-10-CM

## 2025-08-06 PROCEDURE — 99215 OFFICE O/P EST HI 40 MIN: CPT | Mod: PBBFAC,PO | Performed by: INTERNAL MEDICINE

## 2025-08-06 PROCEDURE — G2211 COMPLEX E/M VISIT ADD ON: HCPCS | Mod: ,,, | Performed by: INTERNAL MEDICINE

## 2025-08-06 PROCEDURE — 99215 OFFICE O/P EST HI 40 MIN: CPT | Mod: S$PBB,,, | Performed by: INTERNAL MEDICINE

## 2025-08-06 PROCEDURE — 99999 PR PBB SHADOW E&M-EST. PATIENT-LVL V: CPT | Mod: PBBFAC,,, | Performed by: INTERNAL MEDICINE

## 2025-08-06 RX ORDER — GABAPENTIN 100 MG/1
100 CAPSULE ORAL NIGHTLY
Qty: 90 CAPSULE | Refills: 0 | Status: SHIPPED | OUTPATIENT
Start: 2025-08-06 | End: 2026-08-06

## 2025-08-06 NOTE — PATIENT INSTRUCTIONS
Please contact our Referrals Coordinator at [phone] if you have not received a call within 2 business days to check on the status

## 2025-08-08 ENCOUNTER — TELEPHONE (OUTPATIENT)
Dept: PULMONOLOGY | Facility: CLINIC | Age: 70
End: 2025-08-08
Payer: MEDICARE

## 2025-08-09 NOTE — PROGRESS NOTES
Subjective:     History of Present Illness    CHIEF COMPLAINT:  - Maile presents with concerns about persistent sleep disturbances, particularly waking up around 3 AM nightly and difficulty staying asleep.    HPI:  Miale reports a long-standing issue of waking up consistently around 3 AM nightly, describing it as an internal alarm. This sleep disturbance has been ongoing for years. He typically goes to bed around 11 PM but wakes up between 2:30 and 3:30 AM, regardless of bedtime. He struggles to fall back asleep easily, sometimes only managing to do so around 8 or 9 AM. The early morning hours are particularly challenging due to the quietness, leaving him awake and unable to sleep.    Maile's wife recently observed and recorded an episode of involuntary muscle jerks in his left arm while he was sleeping. This occurred around 8:06 AM and lasted for an extended period. He was unaware of these movements and did not feel them.    He mentions issues with coughing and post-nasal drip, which worsen when lying down, particularly at night. He also notes some hearing difficulties, which may be related to earwax buildup observed during the exam.    He has tried OTC melatonin for his sleep issues, but it was ineffective. A previous sleep medication prescribed by a former doctor was discontinued due to excessive daytime drowsiness. He expresses concerns about developing dementia, noting a family history with his father.    He is pre-diabetic and has glaucoma, for which he takes multiple eye drops and pills, including Diamox (250mg twice daily).    He denies urinating as the reason for waking up at 3 AM. He denies any pain behind the ear or feeling mucus in the throat when pressed.    SOCIAL HISTORY:  - Alcohol: Snores more after drinking alcohol  - Occupation: Retired    Marital status:       ROS:  ENT: +nasal congestion, +post nasal drip  Respiratory: +cough  Neurological: +difficulty staying asleep, +sleep  disturbances         Objective:     Vitals:    08/06/25 1209   BP: 136/66   Pulse: 60   Temp: 98 °F (36.7 °C)       Physical Exam    HENT: Moderate nasal passage swelling.  Ears: Bilateral TMs clear. Moderate cerumen impaction bilaterally.         Assessment/Plan     1. Insomnia, unspecified type        2. Sleep myoclonus  gabapentin (NEURONTIN) 100 MG capsule      3. Insufficient sleep syndrome  Home Sleep Study      4. Chronic sinus complaints  Ambulatory referral/consult to ENT      5. Hyponatremia  Basic Metabolic Panel          Assessment & Plan     Considered sleep myoclonus as potential cause of early morning awakenings based on video evidence of arm jerking during sleep.   Evaluated options for addressing sleep disturbance, taking into account glaucoma and need to avoid medications that could increase intraocular pressure.   Decided against Ambien due to potential glaucoma risk, opting instead for gabapentin to address possible muscle jerks.   Noted moderate nasal swelling.   Found significant wax buildup in both ears but no signs of fluid behind eardrums.    INSOMNIA:  Maile reports consistently waking at 3:00 AM with difficulty returning to sleep, a pattern persisting for years.  Sleep medication history reveals avoidance due to glaucoma concerns and unsuccessful trials with melatonin.  Educated patient about the importance of deep sleep cycles for memory and overall health, emphasizing insomnia as a risk factor for dementia, particularly given family history.  Prescribed gabapentin 100 mg capsule, 1 capsule by mouth at bedtime for a few nights to evaluate effectiveness.    PERIODIC LIMB MOVEMENT DISORDER:  Maile's left arm exhibits sustained muscle jerks during sleep (sleep myoclonus), as reported by partner.  Explained this condition is characterized by muscle contractions during sleep that may impact sleep quality.  Discussed potential connection to hypnic jerks possibly contributing to patient's  awakenings.  The prescribed gabapentin 100 mg should also help suppress these muscle contractions during sleep.    SLEEP APNEA:  Ordered home sleep study to evaluate sleep patterns, oxygen levels, and sleep phases to assess for possible sleep apnea.    POSTNASAL DRIP AND NASAL CONGESTION:  Maile reports coughing and nasal congestion that worsen at night when lying down.  Examination revealed moderately swollen nasal passages.  Referred to Dr. Ilana Resendiz, ENT, for evaluation of sinus problems and nasal congestion.    IMPACTED CERUMEN:  Examination revealed significant cerumen impaction in both ears, more pronounced in the left ear.  No fluid behind the tympanic membrane was observed.  Maile confirms using cerumenolytic drops at home for management.  Discussed ear symptoms and potential ENT evaluation.    GLAUCOMA:  Maile is currently managed with multiple medications for glaucoma, including Combigan and TruSop drops.  Discussed medication effects on glaucoma and importance of avoiding certain sleep medications that could exacerbate the condition.  Prescribed Diamox 250 mg twice daily for ongoing glaucoma management.    PREDIABETES:  Janets prediabetes is stable with glucose levels around 5.8 to 5.9, not significantly elevated.    FAMILY HISTORY OF DEMENTIA:  Discussed patient's concern about developing dementia due to family history.  This concern was addressed in relation to sleep quality under the Insomnia section.     PREVENTIVE CARE:  Provided information on RSV vaccine for adults over 60, including its importance and timing with f lu season.    FOLLOW-UP:  Scheduled follow-up after patient trials gabapentin and completes home sleep study to discuss results and determine further management.         This note was generated with the assistance of ambient listening technology. Verbal consent was obtained by the patient and accompanying visitor(s) for the recording of patient appointment to  facilitate this note. I attest to having reviewed and edited the generated note for accuracy, though some syntax or spelling errors may persist. Please contact the author of this note for any clarification.    I spent a total of 40 minutes on the day of the visit.This includes face to face time and non-face to face time preparing to see the patient (eg, review of tests), obtaining and/or reviewing separately obtained history, documenting clinical information in the electronic or other health record, independently interpreting results and communicating results to the patient/family/caregiver, or care coordinator.      Lamberto Wolfe MD  Internal Medicine-Pediatrics

## 2025-08-20 ENCOUNTER — TELEPHONE (OUTPATIENT)
Dept: SLEEP MEDICINE | Facility: HOSPITAL | Age: 70
End: 2025-08-20
Payer: MEDICARE

## 2025-08-28 ENCOUNTER — PATIENT MESSAGE (OUTPATIENT)
Dept: FAMILY MEDICINE | Facility: CLINIC | Age: 70
End: 2025-08-28
Payer: MEDICARE

## 2025-09-02 ENCOUNTER — TELEPHONE (OUTPATIENT)
Dept: FAMILY MEDICINE | Facility: CLINIC | Age: 70
End: 2025-09-02
Payer: MEDICARE